# Patient Record
Sex: MALE | Race: WHITE | NOT HISPANIC OR LATINO | Employment: FULL TIME | URBAN - METROPOLITAN AREA
[De-identification: names, ages, dates, MRNs, and addresses within clinical notes are randomized per-mention and may not be internally consistent; named-entity substitution may affect disease eponyms.]

---
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
## 2019-08-05 ENCOUNTER — OFFICE VISIT (OUTPATIENT)
Dept: FAMILY MEDICINE | Facility: CLINIC | Age: 19
End: 2019-08-05
Payer: COMMERCIAL

## 2019-08-05 VITALS — WEIGHT: 127 LBS | DIASTOLIC BLOOD PRESSURE: 78 MMHG | BODY MASS INDEX: 17.71 KG/M2 | SYSTOLIC BLOOD PRESSURE: 128 MMHG

## 2019-08-05 DIAGNOSIS — Z79.899 LONG-TERM CURRENT USE OF ISOTRETINOIN: ICD-10-CM

## 2019-08-05 DIAGNOSIS — L70.0 ACNE VULGARIS: Primary | ICD-10-CM

## 2019-08-05 DIAGNOSIS — Z79.899 ENCOUNTER FOR LONG-TERM (CURRENT) USE OF HIGH-RISK MEDICATION: ICD-10-CM

## 2019-08-05 LAB
BASOPHILS # BLD AUTO: 0 10E9/L (ref 0–0.2)
BASOPHILS NFR BLD AUTO: 0.3 %
DIFFERENTIAL METHOD BLD: NORMAL
EOSINOPHIL # BLD AUTO: 0.1 10E9/L (ref 0–0.7)
EOSINOPHIL NFR BLD AUTO: 1.6 %
ERYTHROCYTE [DISTWIDTH] IN BLOOD BY AUTOMATED COUNT: 13.5 % (ref 10–15)
HCT VFR BLD AUTO: 46.1 % (ref 40–53)
HGB BLD-MCNC: 16.2 G/DL (ref 13.3–17.7)
LYMPHOCYTES # BLD AUTO: 1.9 10E9/L (ref 0.8–5.3)
LYMPHOCYTES NFR BLD AUTO: 29.8 %
MCH RBC QN AUTO: 28.4 PG (ref 26.5–33)
MCHC RBC AUTO-ENTMCNC: 35.1 G/DL (ref 31.5–36.5)
MCV RBC AUTO: 81 FL (ref 78–100)
MONOCYTES # BLD AUTO: 0.5 10E9/L (ref 0–1.3)
MONOCYTES NFR BLD AUTO: 8.6 %
NEUTROPHILS # BLD AUTO: 3.8 10E9/L (ref 1.6–8.3)
NEUTROPHILS NFR BLD AUTO: 59.7 %
PLATELET # BLD AUTO: 186 10E9/L (ref 150–450)
RBC # BLD AUTO: 5.7 10E12/L (ref 4.4–5.9)
WBC # BLD AUTO: 6.3 10E9/L (ref 4–11)

## 2019-08-05 PROCEDURE — 85025 COMPLETE CBC W/AUTO DIFF WBC: CPT | Performed by: FAMILY MEDICINE

## 2019-08-05 PROCEDURE — 99213 OFFICE O/P EST LOW 20 MIN: CPT | Performed by: FAMILY MEDICINE

## 2019-08-05 PROCEDURE — 80061 LIPID PANEL: CPT | Performed by: FAMILY MEDICINE

## 2019-08-05 PROCEDURE — 36415 COLL VENOUS BLD VENIPUNCTURE: CPT | Performed by: FAMILY MEDICINE

## 2019-08-05 PROCEDURE — 84450 TRANSFERASE (AST) (SGOT): CPT | Performed by: FAMILY MEDICINE

## 2019-08-05 RX ORDER — ISOTRETINOIN 20 MG/1
20 CAPSULE ORAL 2 TIMES DAILY
Qty: 60 CAPSULE | Refills: 0 | Status: SHIPPED | OUTPATIENT
Start: 2019-08-05 | End: 2019-10-03

## 2019-08-05 NOTE — PATIENT INSTRUCTIONS
"FUTURE APPOINTMENTS  Please get labs done today.  Follow up in 28-31 days.    ORAL ISOTRETINOIN INSTRUCTIONS  CURRENT DOSAGE: Take by mouth one 20 mg tablet, two times a day.      Stop all other acne products, except you may use only Cetaphil or CeraVe facial cleanser.    Take the isotretinoin with a fatty meal (such as peanut butter or yogurt) to improve the absorption of the medication.    OFFICE VISIT INSTRUCTIONS    Schedule follow-up appointments every 28-31 days.    Bring iPledge ID# and PASSWORD with you to each office visit. Make sure you have obtained your password before the next office visit.    IF YOU HAVE NOT RECEIVED YOUR PASSWORD IN THE MAIL IN 2 WEEKS, CALL IPLEDGE.    You will need to do a lab blood draw every month:    Schedule blood draw to be completed 1-2 days prior to each office visit  You may complete these at any [facility] lab; just remember to schedule it before you go.      If you are being seen elsewhere by a dermatologist for oral isotretinoin monitoring, be sure to go into iPledge for \"transfer of care\" for the appropriate physician.    Make sure to always  your medication within 3 days of prescription being sent to the pharmacy    Check with your insurance company for coverage of oral isotretinoin therapy for recalcitrant acne. Ask if they have a preferred brand of oral isotretinoin (e.g. Claravis, Myorisan, Zenatane, Amnesteem, Sotret)    RECOMMENDATIONS FOR DRYNESS    Moisturizer : Cetaphil facial moisturizer.    Nasal mist spray : Ocean brand. Use at bedtime.    Do not use sommer-synephrine.    Lips : Aquaphor ointment, Vaseline jelly, or Vanicream lip protectant for the dryness on the lips.    Eye drops : Refresh tears saline eye drops for dry eye symptoms. Consider also use of gel eye drops at bedtime if excessive eye dryness.    Due to dryness of mouth, floss daily and brush teeth at least twice daily.    Consider supplementation of omega 3 oil 1 gram/day.    Make sure " to apply sunscreen regularly.

## 2019-08-05 NOTE — PROGRESS NOTES
"[facility] - PRIMARY CARE SKIN    CC: Acne  SUBJECTIVE:   Misbah Thompson is a(n) 19 year old male who presents to clinic today for follow-up of acne.    Symptoms have been ongoing for: years (since 6th grade).  The acne is primarily located on the: face.  Acne generally presents as: deeper lesions on the face.    Previous treatments include: oral doxycycline, topical clindamycin, topical tretinoin.  He took oral isotretinoin for 1 month in May 2017 at 40 mg every day but insurance . Acne control had improved while taking oral isotretinoin.    Skin type: oily.  Family history of acne: YES - \"everyone\" in family.  Risk factors for acne: none identified.  Does the patient take a protein supplement?: No.      PHQ-2 Score:   PHQ-2 (  [employer]) 2019   Q1: Little interest or pleasure in doing things 0 0   Q2: Feeling down, depressed or hopeless 0 0   PHQ-2 Score 0 0     iPLEDGE #: [id number]    Refer to electronic medical record (EMR) for past medical history and medications.    INTEGUMENTARY/SKIN: POSITIVE for acne  ROS: 14 point review of systems was negative except the symptoms listed above in the HPI.    This document serves as a record of the services and decisions personally performed and made by Blanca Anand MD and was created by Gautam Harris, a trained medical scribe, based on personal observations and provider statements to the medical scribe.  2019 10:06 AM   Gautam Harris    OBJECTIVE:     Wt Readings from Last 2 Encounters:   19 127 lb (57.6 kg)   17 132 lb 9.6 oz (60.1 kg) (17 %)*     * Growth percentiles are based on CDC (Boys, 2-20 Years) data.     GENERAL: healthy, alert and no distress.  SKIN: Lebron Skin Type - II.  Face, Neck and Trunk examined. The dermatoscope was used to help evaluate pigmented lesions.  Skin Pertinent Findings:  Face: Moderate-severe scarring. Scattered inflammatory papules and nodules in various stages of resolution. Some " "involvement on the upper neck.    Chest: Some inflammatory papules.    Shoulders: some inflammatory papules and some moderate scarring    Back: Clear.    Diagnostic Test Results:  Monitoring CBC, Lipid Profile, AST, (hCG if female).    ASSESSMENT:     Encounter Diagnoses   Name Primary?     Acne vulgaris Yes     Long-term current use of isotretinoin      Encounter for long-term (current) use of high-risk medication      Comment: severe acne, oral isotretinoin treatment with monthly monitoring.  MDM: Side effects of oral isotretinoin reviewed - dryness of the skin and mucous membranes, arthralgias, myalgias, mood changes. Discussed potential flare of the acne.    PLAN:     Patient Instructions   FUTURE APPOINTMENTS  Please get labs done today.  Follow up in 28-31 days.    ORAL ISOTRETINOIN INSTRUCTIONS  CURRENT DOSAGE: Take by mouth one 20 mg tablet, two times a day.      Stop all other acne products, except you may use only Cetaphil or CeraVe facial cleanser.    Take the isotretinoin with a fatty meal (such as peanut butter or yogurt) to improve the absorption of the medication.    OFFICE VISIT INSTRUCTIONS    Schedule follow-up appointments every 28-31 days.    Bring iPledge ID# and PASSWORD with you to each office visit. Make sure you have obtained your password before the next office visit.    IF YOU HAVE NOT RECEIVED YOUR PASSWORD IN THE MAIL IN 2 WEEKS, CALL IPLEDGE.    You will need to do a lab blood draw every month:    Schedule blood draw to be completed 1-2 days prior to each office visit  You may complete these at any [facility] lab; just remember to schedule it before you go.      If you are being seen elsewhere by a dermatologist for oral isotretinoin monitoring, be sure to go into iPledge for \"transfer of care\" for the appropriate physician.    Make sure to always  your medication within 3 days of prescription being sent to the pharmacy    Check with your insurance company for coverage of " oral isotretinoin therapy for recalcitrant acne. Ask if they have a preferred brand of oral isotretinoin (e.g. Claravis, Myorisan, Zenatane, Amnesteem, Sotret)    RECOMMENDATIONS FOR DRYNESS    Moisturizer : Cetaphil facial moisturizer.    Nasal mist spray : Ocean brand. Use at bedtime.    Do not use sommer-synephrine.    Lips : Aquaphor ointment, Vaseline jelly, or Vanicream lip protectant for the dryness on the lips.    Eye drops : Refresh tears saline eye drops for dry eye symptoms. Consider also use of gel eye drops at bedtime if excessive eye dryness.    Due to dryness of mouth, floss daily and brush teeth at least twice daily.    Consider supplementation of omega 3 oil 1 gram/day.    Make sure to apply sunscreen regularly.        When on oral isotretinoin, discontinue all other acne medications. Discussed the importance of sticking with the oral isotretinoin therapy program, not picking or excoriating.    Oral isotretinoin discussed fully with the patient .  Oral isotretinoin is a very effective drug to treat acne vulgaris but has many significant side effects. Chief among these are teratogenesis, hepatic injury, dyslipidemia and severe drying of the mucous membranes. All of these issues have been discussed in detail. Monthly blood tests to monitor lipids and liver functions will be necessary. Expect painful dryness and/or fissuring around the lips, eyes, and other moist areas of the body. Balms may be protective. Contact lenses may be too painful to wear temporarily while on this drug. Episodes of significant depression have been reported, including suicidal ideation and attempts in rare cases. It may also cause pseudotumor cerebri and hyperostosis. The patient will report any such changes in mood, depressive symptoms or suicidal thoughts, headaches, joint or bone pains.    Female patients MUST use two simultaneous methods of family planning. Accutane is Category X for pregnancy, meaning it will cause fetal  teratogenic malformations, and pregnancy MUST be avoided while on this drug. For that reason, the patient is admonished to never share the medication.    The dose is 0.5-1 mg/kg in two divided doses for 15-20 weeks.    After discussion of these important issues, he indicates complete understanding of all of the above, and Does wish to proceed with accutane therapy.    Literature provided: iPLEDGE Program materials. The patient was thoroughly counseled about side effects, benefits, and risks of isotretinoin. he was registered on the iPLEDGE Program website. Initial lab studies were ordered. A signed consent was obtained.    Goal dosage: 8640 mg = 57.6 kg (actual weight) * 150 mg/kg.    Dose:  Month #: 1.  Oral isotretinoin dosage: 20 mg po BID.  Insurance preferred brand: Patient to check on insurance status.    Previous oral isotretinoin dosing:  N/A    Total dose to date of current course: 0 mg.    RTC in one month for follow up, or sooner prn, with laboratory studies completed.    TT: 20 minutes.  CT: 15 minutes.    The information in this document, created by the medical scribe for me, accurately reflects the services I personally performed and the decisions made by me. I have reviewed and approved this document for accuracy prior to leaving the patient care area.  August 5, 2019 10:35 AM  Blanca Anand MD  [facility]

## 2019-08-06 ENCOUNTER — TELEPHONE (OUTPATIENT)
Dept: FAMILY MEDICINE | Facility: CLINIC | Age: 19
End: 2019-08-06

## 2019-08-06 LAB
AST SERPL W P-5'-P-CCNC: 27 U/L (ref 0–45)
CHOLEST SERPL-MCNC: 155 MG/DL
HDLC SERPL-MCNC: 40 MG/DL
LDLC SERPL CALC-MCNC: 99 MG/DL
NONHDLC SERPL-MCNC: 115 MG/DL
TRIGL SERPL-MCNC: 81 MG/DL

## 2019-08-06 NOTE — TELEPHONE ENCOUNTER
Patient states at the pharmacy and was not able to  Accutane rx because the doctor has signed the I Pledge. Please call the patient as soon as signed. Shari Kaye RN

## 2019-08-06 NOTE — TELEPHONE ENCOUNTER
Spoke with the pharmacy and they now have what they need. They will contact the patient. Shari Kaye RN

## 2019-09-05 ENCOUNTER — OFFICE VISIT (OUTPATIENT)
Dept: FAMILY MEDICINE | Facility: CLINIC | Age: 19
End: 2019-09-05
Payer: COMMERCIAL

## 2019-09-05 VITALS — SYSTOLIC BLOOD PRESSURE: 124 MMHG | BODY MASS INDEX: 17.71 KG/M2 | DIASTOLIC BLOOD PRESSURE: 76 MMHG | WEIGHT: 127 LBS

## 2019-09-05 DIAGNOSIS — L70.0 ACNE VULGARIS: Primary | ICD-10-CM

## 2019-09-05 DIAGNOSIS — Z79.899 LONG-TERM CURRENT USE OF ISOTRETINOIN: ICD-10-CM

## 2019-09-05 DIAGNOSIS — Z79.899 ENCOUNTER FOR LONG-TERM (CURRENT) USE OF HIGH-RISK MEDICATION: ICD-10-CM

## 2019-09-05 LAB
BASOPHILS # BLD AUTO: 0 10E9/L (ref 0–0.2)
BASOPHILS NFR BLD AUTO: 0.2 %
DIFFERENTIAL METHOD BLD: NORMAL
EOSINOPHIL # BLD AUTO: 0.1 10E9/L (ref 0–0.7)
EOSINOPHIL NFR BLD AUTO: 1.4 %
ERYTHROCYTE [DISTWIDTH] IN BLOOD BY AUTOMATED COUNT: 13.3 % (ref 10–15)
HCT VFR BLD AUTO: 45.7 % (ref 40–53)
HGB BLD-MCNC: 15.8 G/DL (ref 13.3–17.7)
LYMPHOCYTES # BLD AUTO: 1.5 10E9/L (ref 0.8–5.3)
LYMPHOCYTES NFR BLD AUTO: 30.5 %
MCH RBC QN AUTO: 28.4 PG (ref 26.5–33)
MCHC RBC AUTO-ENTMCNC: 34.6 G/DL (ref 31.5–36.5)
MCV RBC AUTO: 82 FL (ref 78–100)
MONOCYTES # BLD AUTO: 0.4 10E9/L (ref 0–1.3)
MONOCYTES NFR BLD AUTO: 7.6 %
NEUTROPHILS # BLD AUTO: 3 10E9/L (ref 1.6–8.3)
NEUTROPHILS NFR BLD AUTO: 60.3 %
PLATELET # BLD AUTO: 182 10E9/L (ref 150–450)
RBC # BLD AUTO: 5.56 10E12/L (ref 4.4–5.9)
WBC # BLD AUTO: 4.9 10E9/L (ref 4–11)

## 2019-09-05 PROCEDURE — 36415 COLL VENOUS BLD VENIPUNCTURE: CPT | Performed by: FAMILY MEDICINE

## 2019-09-05 PROCEDURE — 84450 TRANSFERASE (AST) (SGOT): CPT | Performed by: FAMILY MEDICINE

## 2019-09-05 PROCEDURE — 99213 OFFICE O/P EST LOW 20 MIN: CPT | Performed by: FAMILY MEDICINE

## 2019-09-05 PROCEDURE — 80061 LIPID PANEL: CPT | Performed by: FAMILY MEDICINE

## 2019-09-05 PROCEDURE — 85025 COMPLETE CBC W/AUTO DIFF WBC: CPT | Performed by: FAMILY MEDICINE

## 2019-09-05 RX ORDER — ISOTRETINOIN 30 MG/1
CAPSULE ORAL
Qty: 60 CAPSULE | Refills: 0 | Status: SHIPPED | OUTPATIENT
Start: 2019-09-05 | End: 2019-11-04 | Stop reason: ALTCHOICE

## 2019-09-05 NOTE — PROGRESS NOTES
[facility] - PRIMARY CARE SKIN    CC: Acne  SUBJECTIVE:   Misbah Thompson is a(n) 19 year old male who presents to clinic today for follow-up of treatment of his acne with oral isotretinoin .  Previous treatments include: oral doxycycline, topical clindamycin, topical tretinoin.  He took oral isotretinoin for 1 month in May 2017 at 40 mg every day but insurance . Acne control had improved while taking oral isotretinoin.    Months completed : 1  Current dosage : 20 mg bid.  Treatment response : arms are dry  Side effects noted : Dryness.    [employer] MachineEDGE #: [id number]    Refer to electronic medical record (EMR) for past medical history and medications.    INTEGUMENTARY/SKIN: POSITIVE for acne  ROS: 14 point review of systems was negative except the symptoms listed above in the HPI.        OBJECTIVE:     Wt Readings from Last 2 Encounters:   19 127 lb (57.6 kg)   17 132 lb 9.6 oz (60.1 kg) (17 %)*     * Growth percentiles are based on [facility] (Boys, 2-20 Years) data.     GENERAL: healthy, alert and no distress.  SKIN: Lebron Skin Type - II.  Face, Neck and Trunk examined. The dermatoscope was used to help evaluate pigmented lesions.  Skin Pertinent Findings:                          Face: Moderate-severe scarring. Inflammatory papules   Chest: Some inflammatory papules.    Shoulders: some inflammatory papules and some moderate scarring    Back: Clear.    Diagnostic Test Results:  Monitoring CBC, Lipid Profile, AST, (hCG if female).    ASSESSMENT:     Encounter Diagnoses   Name Primary?     Acne vulgaris Yes     Long-term current use of isotretinoin      Encounter for long-term (current) use of high-risk medication      Comment: severe acne, oral isotretinoin treatment with monthly monitoring.  MDM: Side effects of oral isotretinoin reviewed - dryness of the skin and mucous membranes, arthralgias, myalgias, mood changes. Discussed potential flare of the acne.    PLAN:     Patient Instructions  "  FUTURE APPOINTMENTS  Follow up in 28-31 days.    ORAL ISOTRETINOIN INSTRUCTIONS  CURRENT DOSAGE: Take by mouth one 30 mg tablet twice per day      Stop all other acne products, except you may use only Cetaphil or CeraVe facial cleanser.    Take the isotretinoin with a fatty meal (such as peanut butter or yogurt) to improve the absorption of the medication.    OFFICE VISIT INSTRUCTIONS    Schedule follow-up appointments every 28-31 days.    Bring iPledge ID# and PASSWORD with you to each office visit. Make sure you have obtained your password before the next office visit.    IF YOU HAVE NOT RECEIVED YOUR PASSWORD IN THE MAIL IN 2 WEEKS, CALL IPLEDGE.    You will need to do a lab blood draw every month:    Schedule blood draw to be completed 1-2 days prior to each office visit  You may complete these at any [facility] lab; just remember to schedule it before you go.      If you are being seen elsewhere by a dermatologist for oral isotretinoin monitoring, be sure to go into iPledge for \"transfer of care\" for the appropriate physician.    Make sure to always  your medication within 3 days of prescription being sent to the pharmacy    Check with your insurance company for coverage of oral isotretinoin therapy for recalcitrant acne. Ask if they have a preferred brand of oral isotretinoin (e.g. Claravis, Myorisan, Zenatane, Amnesteem, Sotret)    RECOMMENDATIONS FOR DRYNESS    Moisturizer : Cetaphil facial moisturizer.    Nasal mist spray : Ocean brand. Use at bedtime.    Do not use sommer-synephrine.    Lips : Aquaphor ointment, Vaseline jelly, or Vanicream lip protectant for the dryness on the lips.    Eye drops : Refresh tears saline eye drops for dry eye symptoms. Consider also use of gel eye drops at bedtime if excessive eye dryness.    Due to dryness of mouth, floss daily and brush teeth at least twice daily.    Consider supplementation of omega 3 oil 1 gram/day.    Make sure to apply sunscreen " regularly.          The dose is 0.5-1 mg/kg in two divided doses for 15-20 weeks.    After discussion of these important issues, he indicates complete understanding of all of the above, and Does wish to proceed with accutane therapy.    Literature provided: iPLEDGE Program materials. The patient was thoroughly counseled about side effects, benefits, and risks of isotretinoin. he was registered on the iPLEDGE Program website. Initial lab studies were ordered. A signed consent was obtained.    Goal dosage: 8640 mg = 57.6 kg (actual weight) * 150 mg/kg.    Dose:  Month #: 2  Oral isotretinoin dosage: 230 mg po BID.  Insurance preferred brand: Patient to check on insurance status.    Previous oral isotretinoin dosing:  N/A    Total dose to date of current course: 1200    RTC in one month for follow up, or sooner prn, with laboratory studies completed.    TT: 20 minutes.  CT: 15 minutes.

## 2019-09-05 NOTE — PATIENT INSTRUCTIONS
"FUTURE APPOINTMENTS  Follow up in 28-31 days.    ORAL ISOTRETINOIN INSTRUCTIONS  CURRENT DOSAGE: Take by mouth one 30 mg tablet twice per day      Stop all other acne products, except you may use only Cetaphil or CeraVe facial cleanser.    Take the isotretinoin with a fatty meal (such as peanut butter or yogurt) to improve the absorption of the medication.    OFFICE VISIT INSTRUCTIONS    Schedule follow-up appointments every 28-31 days.    Bring iPledge ID# and PASSWORD with you to each office visit. Make sure you have obtained your password before the next office visit.    IF YOU HAVE NOT RECEIVED YOUR PASSWORD IN THE MAIL IN 2 WEEKS, CALL IPLEDGE.    You will need to do a lab blood draw every month:    Schedule blood draw to be completed 1-2 days prior to each office visit  You may complete these at any [facility] lab; just remember to schedule it before you go.      If you are being seen elsewhere by a dermatologist for oral isotretinoin monitoring, be sure to go into iPledge for \"transfer of care\" for the appropriate physician.    Make sure to always  your medication within 3 days of prescription being sent to the pharmacy    Check with your insurance company for coverage of oral isotretinoin therapy for recalcitrant acne. Ask if they have a preferred brand of oral isotretinoin (e.g. Claravis, Myorisan, Zenatane, Amnesteem, Sotret)    RECOMMENDATIONS FOR DRYNESS    Moisturizer : Cetaphil facial moisturizer.    Nasal mist spray : Ocean brand. Use at bedtime.    Do not use sommer-synephrine.    Lips : Aquaphor ointment, Vaseline jelly, or Vanicream lip protectant for the dryness on the lips.    Eye drops : Refresh tears saline eye drops for dry eye symptoms. Consider also use of gel eye drops at bedtime if excessive eye dryness.    Due to dryness of mouth, floss daily and brush teeth at least twice daily.    Consider supplementation of omega 3 oil 1 gram/day.    Make sure to apply sunscreen " regularly.

## 2019-09-06 LAB
AST SERPL W P-5'-P-CCNC: 32 U/L (ref 0–45)
CHOLEST SERPL-MCNC: 161 MG/DL
HDLC SERPL-MCNC: 34 MG/DL
LDLC SERPL CALC-MCNC: 103 MG/DL
NONHDLC SERPL-MCNC: 127 MG/DL
TRIGL SERPL-MCNC: 120 MG/DL

## 2019-10-03 ENCOUNTER — OFFICE VISIT (OUTPATIENT)
Dept: FAMILY MEDICINE | Facility: CLINIC | Age: 19
End: 2019-10-03
Payer: COMMERCIAL

## 2019-10-03 VITALS — SYSTOLIC BLOOD PRESSURE: 122 MMHG | DIASTOLIC BLOOD PRESSURE: 74 MMHG

## 2019-10-03 DIAGNOSIS — L70.0 ACNE VULGARIS: Primary | ICD-10-CM

## 2019-10-03 PROCEDURE — 99213 OFFICE O/P EST LOW 20 MIN: CPT | Performed by: FAMILY MEDICINE

## 2019-10-03 RX ORDER — ISOTRETINOIN 40 MG/1
40 CAPSULE ORAL 2 TIMES DAILY
Qty: 60 CAPSULE | Refills: 0 | Status: SHIPPED | OUTPATIENT
Start: 2019-10-03 | End: 2019-11-04

## 2019-10-03 NOTE — PATIENT INSTRUCTIONS
"FUTURE APPOINTMENTS  Follow up in 28-31 days.    ORAL ISOTRETINOIN INSTRUCTIONS  CURRENT DOSAGE: Take by mouth one 40 mg tablet, 2 times a day.      Stop all other acne products, except you may use only Cetaphil or CeraVe facial cleanser.    Take the isotretinoin with a fatty meal (such as peanut butter or yogurt) to improve the absorption of the medication.    OFFICE VISIT INSTRUCTIONS    Schedule follow-up appointments every 28-31 days.    Bring iPledge ID# and PASSWORD with you to each office visit. Make sure you have obtained your password before the next office visit.    IF YOU HAVE NOT RECEIVED YOUR PASSWORD IN THE MAIL IN 2 WEEKS, CALL IPLEDGE.    You will need to do a lab blood draw every month:    Schedule blood draw to be completed 1-2 days prior to each office visit  You may complete these at any [facility] lab; just remember to schedule it before you go.      If you are being seen elsewhere by a dermatologist for oral isotretinoin monitoring, be sure to go into iPledge for \"transfer of care\" for the appropriate physician.    Make sure to always  your medication within 3 days of prescription being sent to the pharmacy    Check with your insurance company for coverage of oral isotretinoin therapy for recalcitrant acne. Ask if they have a preferred brand of oral isotretinoin (e.g. Claravis, Myorisan, Zenatane, Amnesteem, Sotret)    RECOMMENDATIONS FOR DRYNESS    Moisturizer : Cetaphil facial moisturizer.    Nasal mist spray : Ocean brand. Use at bedtime.    Do not use sommer-synephrine.    Lips : Aquaphor ointment, Vaseline jelly, or Vanicream lip protectant for the dryness on the lips.    Eye drops : Refresh tears saline eye drops for dry eye symptoms. Consider also use of gel eye drops at bedtime if excessive eye dryness.    Due to dryness of mouth, floss daily and brush teeth at least twice daily.    Consider supplementation of omega 3 oil 1 gram/day.    Make sure to apply sunscreen " regularly.

## 2019-11-04 ENCOUNTER — OFFICE VISIT (OUTPATIENT)
Dept: FAMILY MEDICINE | Facility: CLINIC | Age: 19
End: 2019-11-04
Payer: COMMERCIAL

## 2019-11-04 VITALS — SYSTOLIC BLOOD PRESSURE: 122 MMHG | DIASTOLIC BLOOD PRESSURE: 70 MMHG

## 2019-11-04 DIAGNOSIS — Z79.899 ENCOUNTER FOR LONG-TERM (CURRENT) USE OF HIGH-RISK MEDICATION: ICD-10-CM

## 2019-11-04 DIAGNOSIS — Z79.899 LONG-TERM CURRENT USE OF ISOTRETINOIN: ICD-10-CM

## 2019-11-04 DIAGNOSIS — L70.0 ACNE VULGARIS: ICD-10-CM

## 2019-11-04 LAB
BASOPHILS # BLD AUTO: 0 10E9/L (ref 0–0.2)
BASOPHILS NFR BLD AUTO: 0.4 %
DIFFERENTIAL METHOD BLD: NORMAL
EOSINOPHIL # BLD AUTO: 0.1 10E9/L (ref 0–0.7)
EOSINOPHIL NFR BLD AUTO: 2.5 %
ERYTHROCYTE [DISTWIDTH] IN BLOOD BY AUTOMATED COUNT: 13.4 % (ref 10–15)
HCT VFR BLD AUTO: 42.8 % (ref 40–53)
HGB BLD-MCNC: 14.8 G/DL (ref 13.3–17.7)
LYMPHOCYTES # BLD AUTO: 1.9 10E9/L (ref 0.8–5.3)
LYMPHOCYTES NFR BLD AUTO: 37.4 %
MCH RBC QN AUTO: 28.5 PG (ref 26.5–33)
MCHC RBC AUTO-ENTMCNC: 34.6 G/DL (ref 31.5–36.5)
MCV RBC AUTO: 83 FL (ref 78–100)
MONOCYTES # BLD AUTO: 0.4 10E9/L (ref 0–1.3)
MONOCYTES NFR BLD AUTO: 7.6 %
NEUTROPHILS # BLD AUTO: 2.7 10E9/L (ref 1.6–8.3)
NEUTROPHILS NFR BLD AUTO: 52.1 %
PLATELET # BLD AUTO: 205 10E9/L (ref 150–450)
RBC # BLD AUTO: 5.19 10E12/L (ref 4.4–5.9)
WBC # BLD AUTO: 5.1 10E9/L (ref 4–11)

## 2019-11-04 PROCEDURE — 80061 LIPID PANEL: CPT | Performed by: FAMILY MEDICINE

## 2019-11-04 PROCEDURE — 99213 OFFICE O/P EST LOW 20 MIN: CPT | Performed by: FAMILY MEDICINE

## 2019-11-04 PROCEDURE — 85025 COMPLETE CBC W/AUTO DIFF WBC: CPT | Performed by: FAMILY MEDICINE

## 2019-11-04 PROCEDURE — 84450 TRANSFERASE (AST) (SGOT): CPT | Performed by: FAMILY MEDICINE

## 2019-11-04 PROCEDURE — 36415 COLL VENOUS BLD VENIPUNCTURE: CPT | Performed by: FAMILY MEDICINE

## 2019-11-04 RX ORDER — ISOTRETINOIN 40 MG/1
40 CAPSULE ORAL 2 TIMES DAILY
Qty: 60 CAPSULE | Refills: 0 | Status: SHIPPED | OUTPATIENT
Start: 2019-11-04 | End: 2019-12-02

## 2019-11-04 NOTE — PATIENT INSTRUCTIONS
"FUTURE APPOINTMENTS  Follow up in 28-31 days.    ORAL ISOTRETINOIN INSTRUCTIONS  CURRENT DOSAGE: Take by mouth one 40 mg tablet, two times a day.      Stop all other acne products, except you may use only Cetaphil or CeraVe facial cleanser.    Take the isotretinoin with a fatty meal (such as peanut butter or yogurt) to improve the absorption of the medication.    OFFICE VISIT INSTRUCTIONS    Schedule follow-up appointments every 28-31 days.    Bring iPledge ID# and PASSWORD with you to each office visit. Make sure you have obtained your password before the next office visit.    IF YOU HAVE NOT RECEIVED YOUR PASSWORD IN THE MAIL IN 2 WEEKS, CALL IPLEDGE.    You will need to do a lab blood draw every month:    Schedule blood draw to be completed 1-2 days prior to each office visit  You may complete these at any [facility] lab; just remember to schedule it before you go.      If you are being seen elsewhere by a dermatologist for oral isotretinoin monitoring, be sure to go into iPledge for \"transfer of care\" for the appropriate physician.    Make sure to always  your medication within 3 days of prescription being sent to the pharmacy    Check with your insurance company for coverage of oral isotretinoin therapy for recalcitrant acne. Ask if they have a preferred brand of oral isotretinoin (e.g. Claravis, Myorisan, Zenatane, Amnesteem, Sotret)    RECOMMENDATIONS FOR DRYNESS    Moisturizer : Cetaphil facial moisturizer.    Nasal mist spray : Ocean brand. Use at bedtime.    Do not use sommer-synephrine.    Lips : Aquaphor ointment, Vaseline jelly, or Vanicream lip protectant for the dryness on the lips.    Eye drops : Refresh tears saline eye drops for dry eye symptoms. Consider also use of gel eye drops at bedtime if excessive eye dryness.    Due to dryness of mouth, floss daily and brush teeth at least twice daily.    Consider supplementation of omega 3 oil 1 gram/day.    Make sure to apply sunscreen " regularly.

## 2019-11-04 NOTE — PROGRESS NOTES
[facility] - PRIMARY CARE SKIN    CC: recalcitrant acne, oral isotretinoin therapy  SUBJECTIVE:   Misbah Thompson is a(n) 19 year old male who presents to clinic today for follow-up of severe, recalcitrant acne.    Oral isotretinoin therapy monitoring    Months completed: 3.  Last month dosage: 40 mg BID.    Treatment response over the last month:  Acne breakouts had increased in the last month.    Side effects noted:    Dryness: YES but manageable.    Arthralgias/myalgias: Soreness is a chronic issue, but he has not noticed any changes.    Mood changes: NO.    Other: None.    PHQ-2 Score:   PHQ-2 ( 1999 [employer]) 9/5/2019 8/5/2019   Q1: Little interest or pleasure in doing things 0 0   Q2: Feeling down, depressed or hopeless 0 0   PHQ-2 Score 0 0     iPLEDGE #: [id number]    Refer to electronic medical record (EMR) for past medical history and medications.    INTEGUMENTARY/SKIN: POSITIVE for acne  ROS: 14 point review of systems was negative except the symptoms listed above in the HPI.    This document serves as a record of the services and decisions personally performed and made by Blanca Anand MD and was created by Gautam Harris, a trained medical scribe, based on personal observations and provider statements to the medical scribe.  November 4, 2019 12:09 PM   Gautam Harris    OBJECTIVE:     Wt Readings from Last 2 Encounters:   09/05/19 127 lb (57.6 kg)   08/05/19 127 lb (57.6 kg)     GENERAL: healthy, alert and no distress.  SKIN: Lebron Skin Type - II.  Face, Neck and Trunk examined. The dermatoscope was used to help evaluate pigmented lesions.  Skin Pertinent Findings:  Face: Scattered infrequent inflammatory papules  Chest: Few scattered inflammatory papules    Diagnostic Test Results:  Monitoring CBC, Lipid Profile, AST, (hCG if female).    ASSESSMENT:     Encounter Diagnoses   Name Primary?     Acne vulgaris      Long-term current use of isotretinoin      Encounter for long-term (current) use  "of high-risk medication      Comment: severe acne, oral isotretinoin treatment with monthly monitoring.  MDM: Side effects of oral isotretinoin reviewed - dryness of the skin and mucous membranes, arthralgias, myalgias, mood changes. Discussed potential flare of the acne.    PLAN:   Patient Instructions   FUTURE APPOINTMENTS  Follow up in 28-31 days.    ORAL ISOTRETINOIN INSTRUCTIONS  CURRENT DOSAGE: Take by mouth one 40 mg tablet, two times a day.      Stop all other acne products, except you may use only Cetaphil or CeraVe facial cleanser.    Take the isotretinoin with a fatty meal (such as peanut butter or yogurt) to improve the absorption of the medication.    OFFICE VISIT INSTRUCTIONS    Schedule follow-up appointments every 28-31 days.    Bring iPledge ID# and PASSWORD with you to each office visit. Make sure you have obtained your password before the next office visit.    IF YOU HAVE NOT RECEIVED YOUR PASSWORD IN THE MAIL IN 2 WEEKS, CALL IPLEDGE.    You will need to do a lab blood draw every month:    Schedule blood draw to be completed 1-2 days prior to each office visit  You may complete these at any [facility] lab; just remember to schedule it before you go.      If you are being seen elsewhere by a dermatologist for oral isotretinoin monitoring, be sure to go into iPledge for \"transfer of care\" for the appropriate physician.    Make sure to always  your medication within 3 days of prescription being sent to the pharmacy    Check with your insurance company for coverage of oral isotretinoin therapy for recalcitrant acne. Ask if they have a preferred brand of oral isotretinoin (e.g. Claravis, Myorisan, Zenatane, Amnesteem, Sotret)    RECOMMENDATIONS FOR DRYNESS    Moisturizer : Cetaphil facial moisturizer.    Nasal mist spray : Ocean brand. Use at bedtime.    Do not use sommer-synephrine.    Lips : Aquaphor ointment, Vaseline jelly, or Vanicream lip protectant for the dryness on the lips.    Eye " drops : Refresh tears saline eye drops for dry eye symptoms. Consider also use of gel eye drops at bedtime if excessive eye dryness.    Due to dryness of mouth, floss daily and brush teeth at least twice daily.    Consider supplementation of omega 3 oil 1 gram/day.    Make sure to apply sunscreen regularly.        When on oral isotretinoin, discontinue all other acne medications. Discussed the importance of sticking with the oral isotretinoin therapy program, not picking or excoriating.    Oral isotretinoin discussed fully with the patient.  Oral isotretinoin is a very effective drug to treat acne vulgaris but has many significant side effects. Chief among these are teratogenesis, hepatic injury, dyslipidemia and severe drying of the mucous membranes. All of these issues have been discussed in detail. Monthly blood tests to monitor lipids and liver functions will be necessary. Expect painful dryness and/or fissuring around the lips, eyes, and other moist areas of the body. Balms may be protective. Contact lenses may be too painful to wear temporarily while on this drug. Episodes of significant depression have been reported, including suicidal ideation and attempts in rare cases. It may also cause pseudotumor cerebri and hyperostosis. The patient will report any such changes in mood, depressive symptoms or suicidal thoughts, headaches, joint or bone pains.    Female patients MUST use two simultaneous methods of family planning. Accutane is Category X for pregnancy, meaning it will cause fetal teratogenic malformations, and pregnancy MUST be avoided while on this drug. For that reason, the patient is admonished to never share the medication.    The dose is 0.5-1 mg/kg in two divided doses for 15-20 weeks.    After discussion of these important issues, he indicates complete understanding of all of the above, and Does wish to proceed with accutane therapy.      Goal dosage: 8640 mg = 57.6 kg (actual weight) * 150  mg/kg.    Dose:  Month #: 4.  Oral isotretinoin dosage: 40 mg po BID.  Plan for next month: Maintain dosage.    Previous oral isotretinoin dosing:  Month #1 (prescribed on 8/5/2019): 20 mg po BID * 30 days = 1200 mg.  Month #2 (prescribed on 9/5/2019): 30 mg po BID * 30 days = 1800 mg.  Month #3 (prescribed on 10/3/2019): 40 mg po BID * 30 days = 2400 mg.    Total dose to date: 5400 mg.    RTC in one month for follow up, or sooner prn, with laboratory studies completed.    TT: 20 minutes.  CT: 15 minutes.    The information in this document, created by the medical scribe for me, accurately reflects the services I personally performed and the decisions made by me. I have reviewed and approved this document for accuracy prior to leaving the patient care area.  November 4, 2019 12:09 PM  Blanca Anand MD  [facility] [place]

## 2019-11-05 LAB
AST SERPL W P-5'-P-CCNC: 24 U/L (ref 0–45)
CHOLEST SERPL-MCNC: 172 MG/DL
HDLC SERPL-MCNC: 32 MG/DL
LDLC SERPL CALC-MCNC: 118 MG/DL
NONHDLC SERPL-MCNC: 140 MG/DL
TRIGL SERPL-MCNC: 109 MG/DL

## 2019-12-02 ENCOUNTER — OFFICE VISIT (OUTPATIENT)
Dept: FAMILY MEDICINE | Facility: CLINIC | Age: 19
End: 2019-12-02
Payer: COMMERCIAL

## 2019-12-02 VITALS — SYSTOLIC BLOOD PRESSURE: 122 MMHG | DIASTOLIC BLOOD PRESSURE: 60 MMHG

## 2019-12-02 DIAGNOSIS — L70.0 ACNE VULGARIS: ICD-10-CM

## 2019-12-02 PROCEDURE — 99213 OFFICE O/P EST LOW 20 MIN: CPT | Performed by: FAMILY MEDICINE

## 2019-12-02 RX ORDER — ISOTRETINOIN 40 MG/1
40 CAPSULE ORAL 2 TIMES DAILY
Qty: 60 CAPSULE | Refills: 0 | Status: SHIPPED | OUTPATIENT
Start: 2019-12-02 | End: 2020-03-30 | Stop reason: DRUGHIGH

## 2019-12-02 NOTE — PROGRESS NOTES
[facility] - PRIMARY CARE SKIN    CC: recalcitrant acne, oral isotretinoin therapy  SUBJECTIVE:   Misbah Thompson is a(n) 19 year old male who presents to clinic today for follow-up of severe, recalcitrant acne.    Oral isotretinoin therapy monitoring    Months completed: 4  Last month dosage: 40 mg BID.    Treatment response over the last month: few breakout on the face and chest.        Side effects noted:    Dryness: YES but manageable.    Arthralgias/myalgias: Soreness is a chronic issue, but he has not noticed any changes.    Mood changes: NO.    Other: None.    PHQ-2 Score:   PHQ-2 ( 1999 Pfizer) 9/5/2019 8/5/2019   Q1: Little interest or pleasure in doing things 0 0   Q2: Feeling down, depressed or hopeless 0 0   PHQ-2 Score 0 0     iPLEDGE #: [id number]    Refer to electronic medical record (EMR) for past medical history and medications.    INTEGUMENTARY/SKIN: POSITIVE for acne  ROS: 14 point review of systems was negative except the symptoms listed above in the HPI.      OBJECTIVE:     Wt Readings from Last 2 Encounters:   09/05/19 127 lb (57.6 kg)   08/05/19 127 lb (57.6 kg)     GENERAL: healthy, alert and no distress.  SKIN: Lebron Skin Type - II.  Face, Neck and Trunk examined. The dermatoscope was used to help evaluate pigmented lesions.  Skin Pertinent Findings:  Face: infrequent papules  Chest: Few scattered inflammatory papules    Diagnostic Test Results:  Monitoring CBC, Lipid Profile, AST, (hCG if female).    ASSESSMENT:     Encounter Diagnosis   Name Primary?     Acne vulgaris      Comment: severe acne, oral isotretinoin treatment with monthly monitoring.  MDM: Side effects of oral isotretinoin reviewed - dryness of the skin and mucous membranes, arthralgias, myalgias, mood changes. Discussed potential flare of the acne.    PLAN:   Patient Instructions   Recheck in one month      When on oral isotretinoin, discontinue all other acne medications. Discussed the importance of sticking  with the oral isotretinoin therapy program, not picking or excoriating.    Oral isotretinoin discussed fully with the patient.  Oral isotretinoin is a very effective drug to treat acne vulgaris but has many significant side effects. Chief among these are teratogenesis, hepatic injury, dyslipidemia and severe drying of the mucous membranes. All of these issues have been discussed in detail. Monthly blood tests to monitor lipids and liver functions will be necessary. Expect painful dryness and/or fissuring around the lips, eyes, and other moist areas of the body. Balms may be protective. Contact lenses may be too painful to wear temporarily while on this drug. Episodes of significant depression have been reported, including suicidal ideation and attempts in rare cases. It may also cause pseudotumor cerebri and hyperostosis. The patient will report any such changes in mood, depressive symptoms or suicidal thoughts, headaches, joint or bone pains.    Female patients MUST use two simultaneous methods of family planning. Accutane is Category X for pregnancy, meaning it will cause fetal teratogenic malformations, and pregnancy MUST be avoided while on this drug. For that reason, the patient is admonished to never share the medication.    The dose is 0.5-1 mg/kg in two divided doses for 15-20 weeks.    After discussion of these important issues, he indicates complete understanding of all of the above, and Does wish to proceed with accutane therapy.      Goal dosage: 8640 mg = 57.6 kg (actual weight) * 150 mg/kg.    Dose:  Month #: 4.  Oral isotretinoin dosage: 40 mg po BID.  Plan for next month: Maintain dosage.    Previous oral isotretinoin dosing:  Month #1 (prescribed on 8/5/2019): 20 mg po BID * 30 days = 1200 mg.  Month #2 (prescribed on 9/5/2019): 30 mg po BID * 30 days = 1800 mg.  Month #3 (prescribed on 10/3/2019): 40 mg po BID * 30 days = 2400 mg.  Month #4 (prescribed on /2019): 40 mg po BID * 30 days = 2400  mg.  Total dose to date: 7800 mg    RTC in one month for follow up, or sooner prn, with laboratory studies completed.    TT: 20 minutes.  CT: 15 minutes.

## 2019-12-02 NOTE — LETTER
12/2/2019         RE: Misbah Thompson  [address] [facility] [id number]        Dear Colleague,    Thank you for referring your patient, Misbah Thompson, to the [facility]. Please see a copy of my visit note below.    [facility] - PRIMARY CARE SKIN    CC: recalcitrant acne, oral isotretinoin therapy  SUBJECTIVE:   Misbah Thompson is a(n) 19 year old male who presents to clinic today for follow-up of severe, recalcitrant acne.    Oral isotretinoin therapy monitoring    Months completed: 4  Last month dosage: 40 mg BID.    Treatment response over the last month: few breakout on the face and chest.        Side effects noted:    Dryness: YES but manageable.    Arthralgias/myalgias: Soreness is a chronic issue, but he has not noticed any changes.    Mood changes: NO.    Other: None.    PHQ-2 Score:   PHQ-2 ( 1999 Pfizer) 9/5/2019 8/5/2019   Q1: Little interest or pleasure in doing things 0 0   Q2: Feeling down, depressed or hopeless 0 0   PHQ-2 Score 0 0     iPLEDGE #: [id number]    Refer to electronic medical record (EMR) for past medical history and medications.    INTEGUMENTARY/SKIN: POSITIVE for acne  ROS: 14 point review of systems was negative except the symptoms listed above in the HPI.      OBJECTIVE:     Wt Readings from Last 2 Encounters:   09/05/19 127 lb (57.6 kg)   08/05/19 127 lb (57.6 kg)     GENERAL: healthy, alert and no distress.  SKIN: Lebron Skin Type - II.  Face, Neck and Trunk examined. The dermatoscope was used to help evaluate pigmented lesions.  Skin Pertinent Findings:  Face: infrequent papules  Chest: Few scattered inflammatory papules    Diagnostic Test Results:  Monitoring CBC, Lipid Profile, AST, (hCG if female).    ASSESSMENT:     Encounter Diagnosis   Name Primary?     Acne vulgaris      Comment: severe acne, oral isotretinoin treatment with monthly monitoring.  MDM: Side effects of oral isotretinoin reviewed - dryness of the skin and  mucous membranes, arthralgias, myalgias, mood changes. Discussed potential flare of the acne.    PLAN:   Patient Instructions   Recheck in one month      When on oral isotretinoin, discontinue all other acne medications. Discussed the importance of sticking with the oral isotretinoin therapy program, not picking or excoriating.    Oral isotretinoin discussed fully with the patient.  Oral isotretinoin is a very effective drug to treat acne vulgaris but has many significant side effects. Chief among these are teratogenesis, hepatic injury, dyslipidemia and severe drying of the mucous membranes. All of these issues have been discussed in detail. Monthly blood tests to monitor lipids and liver functions will be necessary. Expect painful dryness and/or fissuring around the lips, eyes, and other moist areas of the body. Balms may be protective. Contact lenses may be too painful to wear temporarily while on this drug. Episodes of significant depression have been reported, including suicidal ideation and attempts in rare cases. It may also cause pseudotumor cerebri and hyperostosis. The patient will report any such changes in mood, depressive symptoms or suicidal thoughts, headaches, joint or bone pains.    Female patients MUST use two simultaneous methods of family planning. Accutane is Category X for pregnancy, meaning it will cause fetal teratogenic malformations, and pregnancy MUST be avoided while on this drug. For that reason, the patient is admonished to never share the medication.    The dose is 0.5-1 mg/kg in two divided doses for 15-20 weeks.    After discussion of these important issues, he indicates complete understanding of all of the above, and Does wish to proceed with accutane therapy.      Goal dosage: 8640 mg = 57.6 kg (actual weight) * 150 mg/kg.    Dose:  Month #: 4.  Oral isotretinoin dosage: 40 mg po BID.  Plan for next month: Maintain dosage.    Previous oral isotretinoin dosing:  Month #1 (prescribed  on 8/5/2019): 20 mg po BID * 30 days = 1200 mg.  Month #2 (prescribed on 9/5/2019): 30 mg po BID * 30 days = 1800 mg.  Month #3 (prescribed on 10/3/2019): 40 mg po BID * 30 days = 2400 mg.  Month #4 (prescribed on /2019): 40 mg po BID * 30 days = 2400 mg.  Total dose to date: 7800 mg    RTC in one month for follow up, or sooner prn, with laboratory studies completed.    TT: 20 minutes.  CT: 15 minutes.        Again, thank you for allowing me to participate in the care of your patient.        Sincerely,        Blanca Anand MD

## 2019-12-06 ENCOUNTER — HOSPITAL ENCOUNTER (EMERGENCY)
Facility: CLINIC | Age: 19
Discharge: HOME | End: 2019-12-06
Attending: EMERGENCY MEDICINE | Admitting: EMERGENCY MEDICINE
Payer: COMMERCIAL

## 2019-12-06 VITALS
DIASTOLIC BLOOD PRESSURE: 72 MMHG | WEIGHT: 130 LBS | OXYGEN SATURATION: 96 % | TEMPERATURE: 98 F | HEIGHT: 71 IN | SYSTOLIC BLOOD PRESSURE: 123 MMHG | RESPIRATION RATE: 18 BRPM | HEART RATE: 74 BPM | BODY MASS INDEX: 18.2 KG/M2

## 2019-12-06 DIAGNOSIS — L60.0 INGROWN TOENAIL OF LEFT FOOT WITH INFECTION: ICD-10-CM

## 2019-12-06 PROCEDURE — 11730 AVULSION NAIL PLATE SIMPLE 1: CPT | Mod: TA

## 2019-12-06 PROCEDURE — 25000128 H RX IP 250 OP 636

## 2019-12-06 PROCEDURE — 99283 EMERGENCY DEPT VISIT LOW MDM: CPT | Mod: 25

## 2019-12-06 RX ORDER — CEPHALEXIN 500 MG/1
500 CAPSULE ORAL 4 TIMES DAILY
Qty: 28 CAPSULE | Refills: 0 | Status: SHIPPED | OUTPATIENT
Start: 2019-12-06 | End: 2020-01-30

## 2019-12-06 RX ORDER — BUPIVACAINE HYDROCHLORIDE 5 MG/ML
INJECTION, SOLUTION PERINEURAL
Status: COMPLETED
Start: 2019-12-06 | End: 2019-12-06

## 2019-12-06 RX ADMIN — BUPIVACAINE HYDROCHLORIDE: 5 INJECTION, SOLUTION PERINEURAL at 04:30

## 2019-12-06 ASSESSMENT — MIFFLIN-ST. JEOR: SCORE: 1616.81

## 2019-12-06 NOTE — ED AVS SNAPSHOT
[facility] Emergency Department  [address]  [facility] [address]  Phone:  [phone]  Fax:  [phone]                                    Misbah Thompson   MRN: [id number]    Department:  [facility] Emergency Department   Date of Visit:  12/6/2019           After Visit Summary Signature Page    I have received my discharge instructions, and my questions have been answered. I have discussed any challenges I see with this plan with the nurse or doctor.    ..........................................................................................................................................  Patient/Patient Representative Signature      ..........................................................................................................................................  Patient Representative Print Name and Relationship to Patient    ..................................................               ................................................  Date                                   Time    ..........................................................................................................................................  Reviewed by Signature/Title    ...................................................              ..............................................  Date                                               Time          22EPIC Rev 08/18

## 2019-12-06 NOTE — ED PROVIDER NOTES
"  History     Chief Complaint:  Toe Pain    The history is provided by the patient.      Misbah Thompson is a 19 year old otherwise male who presents to the emergency department today for evaluation of left great toe pain. The patient reports that for the past two days he has had a sharp and burning pain in his left great toe. He has noticed pus and blood draining from the toenail and has attempted to drain it on his own at home. The patient states he woke up this morning around 0230 with significant pain, prompting his presentation here in the emergency department.     Allergies:  No Known Drug Allergies     Medications:   Accutane     Past Medical History:    Medical history reviewed. No pertinent history was found.    Past Surgical History:    Surgical history reviewed. No pertinent surgical history.    Family History:    Family history reviewed. No pertinent family history.    Social History:  The patient was accompanied to the ED by his mother.  Smoking Status: Never Smoker  Smokeless Tobacco: Never Used  Alcohol Use: Negative   Drug Use: Negative    Marital Status:  Single     Review of Systems   Musculoskeletal:        Great toe pain, left   All other systems reviewed and are negative.      Physical Exam     Patient Vitals for the past 24 hrs:   BP Temp Temp src Pulse Resp SpO2 Height Weight   12/06/19 0345 123/72 -- -- 74 -- 96 % -- --   12/06/19 0313 133/103 98  F (36.7  C) Oral 88 18 95 % 1.803 m (5' 11\") 59 kg (130 lb)      Physical Exam  Nursing note and vitals reviewed.  Constitutional: Cooperative.   Pulmonary/Chest: Effort normal  Musculoskeletal:  Medial aspect of left great toe was ingrown with decompressed paronychia.   Neurological: Alert.   Skin: Skin is warm and dry.   Psychiatric: Normal mood and affect.      Emergency Department Course     Procedures:    DIGITAL BLOCK  INDICATION: Anesthesia prior to toenail excision  LOCATION:  Left great toe  ANESTHESIA: Regional block using 0.5% " Bupivicaine  PROCEDURE NOTE: The patient's left great toe was blocked with the above. He tolerated the procedure well and there were no complications.      MEDIAL TOENAIL EXCISION  INDICATION: Ingrown toenail with infection  CONSENT: Verbal consent was obtained from the patient prior to the procedure. Risks and benefits explained, questions answered.   LOCATION: Left great toe  PROCEDURE NOTE: The toe was anesthetized using a digital block with 0.5% Bupivacaine in a normal fashion. Skin was cleaned with Shur-Cleanse. Using blunt dissection technique I was able to lift the medial left third of the toenail off of the nailbed. Sharp dissection was used to excise the medial third of the toenail and then was removed with a clamp. The patient tolerated the procedure well with no complications. The wound was covered with Bacitracin and sterile dressings.       Emergency Department Course:  0318 Nursing notes and vitals reviewed.  0324 I performed an exam of the patient as documented above.   0345 I excised a portion of the patient's toenail as detailed above. on the patient as detailed above.     Findings and plan explained to the Patient and mother. Patient discharged home with instructions regarding supportive care, medications, and reasons to return. The importance of close follow-up was reviewed. The patient was prescribed Keflex.     I personally reviewed the treatment plan with the Patient and mother and answered all related questions prior to discharge.     Impression & Plan      Medical Decision Making:  Misbah Thompson is a 19 year old gentleman with an ingrown left great toe with infection. Excision of the medial aspect was preformed as per the procedure note. He will be placed on antibiotics and follow up with his regular physician as needed.     Diagnosis:    ICD-10-CM    1. Ingrown great toenail of left foot with infection L60.0        Disposition:  The patient is discharged to home.     Discharge  Medications:  New Prescriptions    CEPHALEXIN (KEFLEX) 500 MG CAPSULE    Take 1 capsule (500 mg) by mouth 4 times daily for 7 days       Scribe Disclosure:  I, Myrna Ralph, am serving as a scribe at 3:24 AM on 12/6/2019 to document services personally performed by Mika Murdock MD based on my observations and the provider's statements to me.    12/6/2019   [facility] EMERGENCY DEPARTMENT       Mika Murdock MD  12/06/19 0609

## 2019-12-06 NOTE — ED TRIAGE NOTES
Here for left great toe. Has ingrown toenail for 2 days. Woke at 1:30am with worsening pain. Per patient, did try digging his toenail. ABCs intact.

## 2019-12-06 NOTE — LETTER
December 6, 2019      To Whom It May Concern:      Misbah Thompson was seen in our Emergency Department today, 12/06/19.  I expect his condition to improve over the next 2 days. He may return to work when improved.    Sincerely,        Honorio Tinoco RN

## 2020-01-29 NOTE — PROGRESS NOTES
[facility] - PRIMARY CARE SKIN    CC: recalcitrant acne, oral isotretinoin therapy  SUBJECTIVE:   Misbah Thompson is a(n) 19 year old male who presents to clinic today for follow-up of recalcitrant acne.     Pt reports he was sick with an unrelated illness for a few weeks and was unable to make it in to his appointment for Month #6. PT has been off of oral isotretinoin for 2-3 weeks. PT reports he has been moisturizing. Pt would like to continue. He will be resigning iPledge agreement.     Oral isotretinoin therapy monitoring    Months completed: 5. Pt was not seen for month 6 due to unrelated illness.   Last month dosage: 40 mg BID.    Treatment response over the last month: few breakout on the face and chest.        Side effects noted:    Dryness: YES but manageable.    Arthralgias/myalgias: No     Mood changes: NO.    Other: None. Had rash on arms that resolved.     PHQ-2 Score:   PHQ-2 ( 1999 Pfizer) 9/5/2019 8/5/2019   Q1: Little interest or pleasure in doing things 0 0   Q2: Feeling down, depressed or hopeless 0 0   PHQ-2 Score 0 0     iPLEDGE #: [id number]    Refer to electronic medical record (EMR) for past medical history and medications.    INTEGUMENTARY/SKIN: POSITIVE for acne  ROS: 14 point review of systems was negative except the symptoms listed above in the HPI.    This document serves as a record of the services and decisions personally performed and made by Blanca Anand MD and was created by Lloyd Ramirez, a trained medical scribe, based on personal observations and provider statements to the medical scribe.  January 30, 2020 10:03 AM   Lloyd Ramirez    OBJECTIVE:     Wt Readings from Last 2 Encounters:   12/06/19 130 lb (59 kg)   09/05/19 127 lb (57.6 kg)     GENERAL: healthy, alert and no distress.  SKIN: Lebron Skin Type - II.  Face, Neck and Trunk examined. The dermatoscope was used to help evaluate pigmented lesions.  Skin Pertinent Findings:  Face: Residual scarring,  moderate.   Chest: Few scattered inflammatory papules  Back and shoulders: Residual scarring, moderate.     Diagnostic Test Results:  Monitoring CBC, Lipid Profile, AST, (hCG if female).    ASSESSMENT:     No diagnosis found.  Comment: severe acne, oral isotretinoin treatment with monthly monitoring.  MDM: Side effects of oral isotretinoin reviewed - dryness of the skin and mucous membranes, arthralgias, myalgias, mood changes. Discussed potential flare of the acne.    PLAN:   There are no Patient Instructions on file for this visit.    When on oral isotretinoin, discontinue all other acne medications. Discussed the importance of sticking with the oral isotretinoin therapy program, not picking or excoriating.    Oral isotretinoin discussed fully with the patient.  Oral isotretinoin is a very effective drug to treat acne vulgaris but has many significant side effects. Chief among these are teratogenesis, hepatic injury, dyslipidemia and severe drying of the mucous membranes. All of these issues have been discussed in detail. Monthly blood tests to monitor lipids and liver functions will be necessary. Expect painful dryness and/or fissuring around the lips, eyes, and other moist areas of the body. Balms may be protective. Contact lenses may be too painful to wear temporarily while on this drug. Episodes of significant depression have been reported, including suicidal ideation and attempts in rare cases. It may also cause pseudotumor cerebri and hyperostosis. The patient will report any such changes in mood, depressive symptoms or suicidal thoughts, headaches, joint or bone pains.    Female patients MUST use two simultaneous methods of family planning. Accutane is Category X for pregnancy, meaning it will cause fetal teratogenic malformations, and pregnancy MUST be avoided while on this drug. For that reason, the patient is admonished to never share the medication.    The dose is 0.5-1 mg/kg in two divided doses for  15-20 weeks.    After discussion of these important issues, he indicates complete understanding of all of the above, and Does wish to proceed with accutane therapy.      Goal dosage: 8640 mg = 59 kg (actual weight) * 150 mg/kg.    Dose:  Month #6.  Oral isotretinoin dosage: 30 mg po BID.    Previous oral isotretinoin dosing:  Month #1 (prescribed on 8/5/2019): 20 mg po BID * 30 days = 1200 mg.  Month #2 (prescribed on 9/5/2019): 30 mg po BID * 30 days = 1800 mg.  Month #3 (prescribed on 10/3/2019): 40 mg po BID * 30 days = 2400 mg.  Month #4 (prescribed on 11/04/2019): 40 mg po BID * 30 days = 2400 mg  Month #5 (prescribed on 12/02/2019): 40 mg po BID * 30 days = 2400 mg  Total dose to date: 10,200 mg    RTC in one month for follow up, or sooner prn, with laboratory studies completed.    TT: 20 minutes.  CT: 15 minutes.    The information in this document, created by the medical scribe for me, accurately reflects the services I personally performed and the decisions made by me. I have reviewed and approved this document for accuracy prior to leaving the patient care area.  January 30, 2020 10:03 AM  Blanca Anand MD  [facility] [place]

## 2020-01-30 ENCOUNTER — OFFICE VISIT (OUTPATIENT)
Dept: FAMILY MEDICINE | Facility: CLINIC | Age: 20
End: 2020-01-30
Payer: COMMERCIAL

## 2020-01-30 VITALS — SYSTOLIC BLOOD PRESSURE: 112 MMHG | DIASTOLIC BLOOD PRESSURE: 64 MMHG

## 2020-01-30 DIAGNOSIS — L70.0 ACNE VULGARIS: Primary | ICD-10-CM

## 2020-01-30 PROCEDURE — 99213 OFFICE O/P EST LOW 20 MIN: CPT | Performed by: FAMILY MEDICINE

## 2020-01-30 RX ORDER — ISOTRETINOIN 30 MG/1
CAPSULE ORAL
Qty: 60 CAPSULE | Refills: 0 | Status: SHIPPED | OUTPATIENT
Start: 2020-01-30 | End: 2020-03-30 | Stop reason: DRUGHIGH

## 2020-01-30 NOTE — LETTER
1/30/2020         RE: Misbah Thompson  [id number] [address] [address]        Dear Colleague,    Thank you for referring your patient, Misbah Thompson, to the [facility] [place]. Please see a copy of my visit note below.    [facility] - PRIMARY CARE SKIN    CC: recalcitrant acne, oral isotretinoin therapy  SUBJECTIVE:   Misbah Thompson is a(n) 19 year old male who presents to clinic today for follow-up of recalcitrant acne.     Pt reports he was sick with an unrelated illness for a few weeks and was unable to make it in to his appointment for Month #6. PT has been off of oral isotretinoin for 2-3 weeks. PT reports he has been moisturizing. Pt would like to continue. He will be resigning iPledge agreement.     Oral isotretinoin therapy monitoring    Months completed: 5. Pt was not seen for month 6 due to unrelated illness.   Last month dosage: 40 mg BID.    Treatment response over the last month: few breakout on the face and chest.        Side effects noted:    Dryness: YES but manageable.    Arthralgias/myalgias: No     Mood changes: NO.    Other: None. Had rash on arms that resolved.     PHQ-2 Score:   PHQ-2 ( 1999 Pfizer) 9/5/2019 8/5/2019   Q1: Little interest or pleasure in doing things 0 0   Q2: Feeling down, depressed or hopeless 0 0   PHQ-2 Score 0 0     iPLEDGE #: [id number]    Refer to electronic medical record (EMR) for past medical history and medications.    INTEGUMENTARY/SKIN: POSITIVE for acne  ROS: 14 point review of systems was negative except the symptoms listed above in the HPI.    This document serves as a record of the services and decisions personally performed and made by Blanca Anand MD and was created by Lloyd Ramirez, a trained medical scribe, based on personal observations and provider statements to the medical scribe.  January 30, 2020 10:03 AM   Lloyd Ramirez    OBJECTIVE:     Wt Readings from Last 2 Encounters:   12/06/19 130 lb (59  kg)   09/05/19 127 lb (57.6 kg)     GENERAL: healthy, alert and no distress.  SKIN: Lebron Skin Type - II.  Face, Neck and Trunk examined. The dermatoscope was used to help evaluate pigmented lesions.  Skin Pertinent Findings:  Face: Residual scarring, moderate.   Chest: Few scattered inflammatory papules  Back and shoulders: Residual scarring, moderate.     Diagnostic Test Results:  Monitoring CBC, Lipid Profile, AST, (hCG if female).    ASSESSMENT:     No diagnosis found.  Comment: severe acne, oral isotretinoin treatment with monthly monitoring.  MDM: Side effects of oral isotretinoin reviewed - dryness of the skin and mucous membranes, arthralgias, myalgias, mood changes. Discussed potential flare of the acne.    PLAN:   There are no Patient Instructions on file for this visit.    When on oral isotretinoin, discontinue all other acne medications. Discussed the importance of sticking with the oral isotretinoin therapy program, not picking or excoriating.    Oral isotretinoin discussed fully with the patient.  Oral isotretinoin is a very effective drug to treat acne vulgaris but has many significant side effects. Chief among these are teratogenesis, hepatic injury, dyslipidemia and severe drying of the mucous membranes. All of these issues have been discussed in detail. Monthly blood tests to monitor lipids and liver functions will be necessary. Expect painful dryness and/or fissuring around the lips, eyes, and other moist areas of the body. Balms may be protective. Contact lenses may be too painful to wear temporarily while on this drug. Episodes of significant depression have been reported, including suicidal ideation and attempts in rare cases. It may also cause pseudotumor cerebri and hyperostosis. The patient will report any such changes in mood, depressive symptoms or suicidal thoughts, headaches, joint or bone pains.    Female patients MUST use two simultaneous methods of family planning. Accutane is  Category X for pregnancy, meaning it will cause fetal teratogenic malformations, and pregnancy MUST be avoided while on this drug. For that reason, the patient is admonished to never share the medication.    The dose is 0.5-1 mg/kg in two divided doses for 15-20 weeks.    After discussion of these important issues, he indicates complete understanding of all of the above, and Does wish to proceed with accutane therapy.      Goal dosage: 8640 mg = 59 kg (actual weight) * 150 mg/kg.    Dose:  Month #6.  Oral isotretinoin dosage: 30 mg po BID.    Previous oral isotretinoin dosing:  Month #1 (prescribed on 8/5/2019): 20 mg po BID * 30 days = 1200 mg.  Month #2 (prescribed on 9/5/2019): 30 mg po BID * 30 days = 1800 mg.  Month #3 (prescribed on 10/3/2019): 40 mg po BID * 30 days = 2400 mg.  Month #4 (prescribed on 11/04/2019): 40 mg po BID * 30 days = 2400 mg  Month #5 (prescribed on 12/02/2019): 40 mg po BID * 30 days = 2400 mg  Total dose to date: 10,200 mg    RTC in one month for follow up, or sooner prn, with laboratory studies completed.    TT: 20 minutes.  CT: 15 minutes.    The information in this document, created by the medical scribe for me, accurately reflects the services I personally performed and the decisions made by me. I have reviewed and approved this document for accuracy prior to leaving the patient care area.  January 30, 2020 10:03 AM  Blanca Anand MD  [facility] [place]        Again, thank you for allowing me to participate in the care of your patient.        Sincerely,        Blanca Anand MD

## 2020-01-30 NOTE — PATIENT INSTRUCTIONS
"FUTURE APPOINTMENTS  Follow up in 28-31 days.    ORAL ISOTRETINOIN INSTRUCTIONS  CURRENT DOSAGE: Take by mouth One 30 mg tablet, two times a day.      Stop all other acne products, except you may use only Cetaphil or CeraVe facial cleanser.    Take the isotretinoin with a fatty meal (such as peanut butter or yogurt) to improve the absorption of the medication.  CERAVE CREAM  OFFICE VISIT INSTRUCTIONS    Schedule follow-up appointments every 28-31 days.    Bring iPledge ID# and PASSWORD with you to each office visit. Make sure you have obtained your password before the next office visit.    IF YOU HAVE NOT RECEIVED YOUR PASSWORD IN THE MAIL IN 2 WEEKS, CALL IPLEDGE.    You will need to do a lab blood draw every month:    Schedule blood draw to be completed 1-2 days prior to each office visit  You may complete these at any [facility] lab; just remember to schedule it before you go.      If you are being seen elsewhere by a dermatologist for oral isotretinoin monitoring, be sure to go into iPledge for \"transfer of care\" for the appropriate physician.    Make sure to always  your medication within 3 days of prescription being sent to the pharmacy    Check with your insurance company for coverage of oral isotretinoin therapy for recalcitrant acne. Ask if they have a preferred brand of oral isotretinoin (e.g. Claravis, Myorisan, Zenatane, Amnesteem, Sotret)    RECOMMENDATIONS FOR DRYNESS    Moisturizer : Cetaphil facial moisturizer.    Nasal mist spray : Ocean brand. Use at bedtime.    Do not use sommer-synephrine.    Lips : Aquaphor ointment, Vaseline jelly, or Vanicream lip protectant for the dryness on the lips.    Eye drops : Refresh tears saline eye drops for dry eye symptoms. Consider also use of gel eye drops at bedtime if excessive eye dryness.    Due to dryness of mouth, floss daily and brush teeth at least twice daily.    Consider supplementation of omega 3 oil 1 gram/day.    Make sure to apply " sunscreen regularly.

## 2020-02-25 NOTE — PROGRESS NOTES
[facility] - PRIMARY CARE SKIN    CC: recalcitrant acne, oral isotretinoin therapy  SUBJECTIVE:   Misbah Thompson is a(n) 19 year old male who presents to clinic today for follow-up of recalcitrant acne.         Oral isotretinoin therapy monitoring    Months completed: 6  ( Pt was not seen for month 6 due to unrelated illness. )  Last month dosage: 30 mg BID.    Treatment response over the last month: few breakout on the face and chest.        Side effects noted:    Dryness: YES but manageable.    Arthralgias/myalgias: No     Mood changes: NO.    Other: None. Had rash on arms that resolved.     PHQ-2 Score:   PHQ-2 ( 1999 Pfizer) 1/30/2020 9/5/2019   Q1: Little interest or pleasure in doing things 0 0   Q2: Feeling down, depressed or hopeless 0 0   PHQ-2 Score 0 0     iPLEDGE #: [id number]    Refer to electronic medical record (EMR) for past medical history and medications.    INTEGUMENTARY/SKIN: POSITIVE for acne  ROS: 14 point review of systems was negative except the symptoms listed above in the HPI.      OBJECTIVE:     Wt Readings from Last 2 Encounters:   12/06/19 130 lb (59 kg)   09/05/19 127 lb (57.6 kg)     GENERAL: healthy, alert and no distress.  SKIN: Lebron Skin Type - II.  Face, Neck and Trunk examined. The dermatoscope was used to help evaluate pigmented lesions.  Skin Pertinent Findings:  Face: Residual scarring, moderate. Single papule  Chest: no inflammatory papules   Back and shoulders: Residual scarring, moderate.     Diagnostic Test Results:  Monitoring CBC, Lipid Profile, AST, (hCG if female).    ASSESSMENT:     Encounter Diagnoses   Name Primary?     Acne vulgaris Yes     Encounter for long-term (current) use of medications      Comment: severe acne, oral isotretinoin treatment with monthly monitoring.  MDM: Side effects of oral isotretinoin reviewed - dryness of the skin and mucous membranes, arthralgias, myalgias, mood changes. Discussed potential flare of the acne.    PLAN:    There are no Patient Instructions on file for this visit.    When on oral isotretinoin, discontinue all other acne medications. Discussed the importance of sticking with the oral isotretinoin therapy program, not picking or excoriating.    Oral isotretinoin discussed fully with the patient.  Oral isotretinoin is a very effective drug to treat acne vulgaris but has many significant side effects. Chief among these are teratogenesis, hepatic injury, dyslipidemia and severe drying of the mucous membranes. All of these issues have been discussed in detail. Monthly blood tests to monitor lipids and liver functions will be necessary. Expect painful dryness and/or fissuring around the lips, eyes, and other moist areas of the body. Balms may be protective. Contact lenses may be too painful to wear temporarily while on this drug. Episodes of significant depression have been reported, including suicidal ideation and attempts in rare cases. It may also cause pseudotumor cerebri and hyperostosis. The patient will report any such changes in mood, depressive symptoms or suicidal thoughts, headaches, joint or bone pains.    Female patients MUST use two simultaneous methods of family planning. Accutane is Category X for pregnancy, meaning it will cause fetal teratogenic malformations, and pregnancy MUST be avoided while on this drug. For that reason, the patient is admonished to never share the medication.    The dose is 0.5-1 mg/kg in two divided doses for 15-20 weeks.    After discussion of these important issues, he indicates complete understanding of all of the above, and Does wish to proceed with accutane therapy.      Goal dosage: 8640 mg = 59 kg (actual weight) * 150 mg/kg.    Dose:  Month #6.  Oral isotretinoin dosage: 40 mg bid    Previous oral isotretinoin dosing:  Month #1 (prescribed on 8/5/2019): 20 mg po BID * 30 days = 1200 mg.  Month #2 (prescribed on 9/5/2019): 30 mg po BID * 30 days = 1800 mg.  Month #3  (prescribed on 10/3/2019): 40 mg po BID * 30 days = 2400 mg.  Month #4 (prescribed on 11/04/2019): 40 mg po BID * 30 days = 2400 mg  Month #5 (prescribed on 12/02/2019): 40 mg po BID * 30 days = 2400 mg  Month #6 (prescribed on 01/30/2020): 30 mg po BID * 30 days =1800 mg  Total dose to date: 04268 mg    RTC in one month for follow up, or sooner prn, with laboratory studies completed.    TT: 20 minutes.  CT: 15 minutes.

## 2020-02-26 ENCOUNTER — OFFICE VISIT (OUTPATIENT)
Dept: FAMILY MEDICINE | Facility: CLINIC | Age: 20
End: 2020-02-26
Payer: COMMERCIAL

## 2020-02-26 VITALS — SYSTOLIC BLOOD PRESSURE: 122 MMHG | DIASTOLIC BLOOD PRESSURE: 70 MMHG

## 2020-02-26 DIAGNOSIS — Z79.899 LONG-TERM CURRENT USE OF ISOTRETINOIN: ICD-10-CM

## 2020-02-26 DIAGNOSIS — Z79.899 ENCOUNTER FOR LONG-TERM (CURRENT) USE OF MEDICATIONS: ICD-10-CM

## 2020-02-26 DIAGNOSIS — Z79.899 ENCOUNTER FOR LONG-TERM (CURRENT) USE OF HIGH-RISK MEDICATION: ICD-10-CM

## 2020-02-26 DIAGNOSIS — L30.9 ECZEMA, UNSPECIFIED TYPE: ICD-10-CM

## 2020-02-26 DIAGNOSIS — L70.0 ACNE VULGARIS: Primary | ICD-10-CM

## 2020-02-26 LAB
BASOPHILS # BLD AUTO: 0 10E9/L (ref 0–0.2)
BASOPHILS NFR BLD AUTO: 0.3 %
DIFFERENTIAL METHOD BLD: NORMAL
EOSINOPHIL # BLD AUTO: 0.2 10E9/L (ref 0–0.7)
EOSINOPHIL NFR BLD AUTO: 2.6 %
ERYTHROCYTE [DISTWIDTH] IN BLOOD BY AUTOMATED COUNT: 13.2 % (ref 10–15)
HCT VFR BLD AUTO: 45 % (ref 40–53)
HGB BLD-MCNC: 15.1 G/DL (ref 13.3–17.7)
LYMPHOCYTES # BLD AUTO: 2.2 10E9/L (ref 0.8–5.3)
LYMPHOCYTES NFR BLD AUTO: 35.5 %
MCH RBC QN AUTO: 28.1 PG (ref 26.5–33)
MCHC RBC AUTO-ENTMCNC: 33.6 G/DL (ref 31.5–36.5)
MCV RBC AUTO: 84 FL (ref 78–100)
MONOCYTES # BLD AUTO: 0.5 10E9/L (ref 0–1.3)
MONOCYTES NFR BLD AUTO: 8.5 %
NEUTROPHILS # BLD AUTO: 3.3 10E9/L (ref 1.6–8.3)
NEUTROPHILS NFR BLD AUTO: 53.1 %
PLATELET # BLD AUTO: 197 10E9/L (ref 150–450)
RBC # BLD AUTO: 5.37 10E12/L (ref 4.4–5.9)
WBC # BLD AUTO: 6.2 10E9/L (ref 4–11)

## 2020-02-26 PROCEDURE — 84450 TRANSFERASE (AST) (SGOT): CPT | Performed by: FAMILY MEDICINE

## 2020-02-26 PROCEDURE — 99213 OFFICE O/P EST LOW 20 MIN: CPT | Performed by: FAMILY MEDICINE

## 2020-02-26 PROCEDURE — 80061 LIPID PANEL: CPT | Performed by: FAMILY MEDICINE

## 2020-02-26 PROCEDURE — 36415 COLL VENOUS BLD VENIPUNCTURE: CPT | Performed by: FAMILY MEDICINE

## 2020-02-26 PROCEDURE — 85025 COMPLETE CBC W/AUTO DIFF WBC: CPT | Performed by: FAMILY MEDICINE

## 2020-02-26 RX ORDER — TRIAMCINOLONE ACETONIDE 1 MG/G
CREAM TOPICAL 2 TIMES DAILY
Qty: 80 G | Refills: 0 | Status: SHIPPED | OUTPATIENT
Start: 2020-02-26

## 2020-02-26 RX ORDER — ISOTRETINOIN 40 MG/1
40 CAPSULE ORAL 2 TIMES DAILY
Qty: 60 CAPSULE | Refills: 0 | Status: SHIPPED | OUTPATIENT
Start: 2020-02-26 | End: 2020-03-30

## 2020-02-26 NOTE — LETTER
2/26/2020         RE: Misbah Thompson  [id number] [address] [address]        Dear Colleague,    Thank you for referring your patient, Misbah Thompson, to the [facility]. Please see a copy of my visit note below.    [facility] - PRIMARY CARE SKIN    CC: recalcitrant acne, oral isotretinoin therapy  SUBJECTIVE:   Misbah Thompson is a(n) 19 year old male who presents to clinic today for follow-up of recalcitrant acne.         Oral isotretinoin therapy monitoring    Months completed: 6  ( Pt was not seen for month 6 due to unrelated illness. )  Last month dosage: 30 mg BID.    Treatment response over the last month: few breakout on the face and chest.        Side effects noted:    Dryness: YES but manageable.    Arthralgias/myalgias: No     Mood changes: NO.    Other: None. Had rash on arms that resolved.     PHQ-2 Score:   PHQ-2 ( 1999 Pfizer) 1/30/2020 9/5/2019   Q1: Little interest or pleasure in doing things 0 0   Q2: Feeling down, depressed or hopeless 0 0   PHQ-2 Score 0 0     iPLEDGE #: [id number]    Refer to electronic medical record (EMR) for past medical history and medications.    INTEGUMENTARY/SKIN: POSITIVE for acne  ROS: 14 point review of systems was negative except the symptoms listed above in the HPI.      OBJECTIVE:     Wt Readings from Last 2 Encounters:   12/06/19 130 lb (59 kg)   09/05/19 127 lb (57.6 kg)     GENERAL: healthy, alert and no distress.  SKIN: Lebron Skin Type - II.  Face, Neck and Trunk examined. The dermatoscope was used to help evaluate pigmented lesions.  Skin Pertinent Findings:  Face: Residual scarring, moderate. Single papule  Chest: no inflammatory papules   Back and shoulders: Residual scarring, moderate.     Diagnostic Test Results:  Monitoring CBC, Lipid Profile, AST, (hCG if female).    ASSESSMENT:     Encounter Diagnoses   Name Primary?     Acne vulgaris Yes     Encounter for long-term (current) use of medications       Comment: severe acne, oral isotretinoin treatment with monthly monitoring.  MDM: Side effects of oral isotretinoin reviewed - dryness of the skin and mucous membranes, arthralgias, myalgias, mood changes. Discussed potential flare of the acne.    PLAN:   There are no Patient Instructions on file for this visit.    When on oral isotretinoin, discontinue all other acne medications. Discussed the importance of sticking with the oral isotretinoin therapy program, not picking or excoriating.    Oral isotretinoin discussed fully with the patient.  Oral isotretinoin is a very effective drug to treat acne vulgaris but has many significant side effects. Chief among these are teratogenesis, hepatic injury, dyslipidemia and severe drying of the mucous membranes. All of these issues have been discussed in detail. Monthly blood tests to monitor lipids and liver functions will be necessary. Expect painful dryness and/or fissuring around the lips, eyes, and other moist areas of the body. Balms may be protective. Contact lenses may be too painful to wear temporarily while on this drug. Episodes of significant depression have been reported, including suicidal ideation and attempts in rare cases. It may also cause pseudotumor cerebri and hyperostosis. The patient will report any such changes in mood, depressive symptoms or suicidal thoughts, headaches, joint or bone pains.    Female patients MUST use two simultaneous methods of family planning. Accutane is Category X for pregnancy, meaning it will cause fetal teratogenic malformations, and pregnancy MUST be avoided while on this drug. For that reason, the patient is admonished to never share the medication.    The dose is 0.5-1 mg/kg in two divided doses for 15-20 weeks.    After discussion of these important issues, he indicates complete understanding of all of the above, and Does wish to proceed with accutane therapy.      Goal dosage: 8640 mg = 59 kg (actual weight) * 150  mg/kg.    Dose:  Month #6.  Oral isotretinoin dosage: 40 mg bid    Previous oral isotretinoin dosing:  Month #1 (prescribed on 8/5/2019): 20 mg po BID * 30 days = 1200 mg.  Month #2 (prescribed on 9/5/2019): 30 mg po BID * 30 days = 1800 mg.  Month #3 (prescribed on 10/3/2019): 40 mg po BID * 30 days = 2400 mg.  Month #4 (prescribed on 11/04/2019): 40 mg po BID * 30 days = 2400 mg  Month #5 (prescribed on 12/02/2019): 40 mg po BID * 30 days = 2400 mg  Month #6 (prescribed on 01/30/2020): 30 mg po BID * 30 days =1800 mg  Total dose to date: 38225 mg    RTC in one month for follow up, or sooner prn, with laboratory studies completed.    TT: 20 minutes.  CT: 15 minutes.        Again, thank you for allowing me to participate in the care of your patient.        Sincerely,        Blanca Anand MD

## 2020-02-26 NOTE — PATIENT INSTRUCTIONS
Isotretinoin 40 mg two times per day  Recheck in one month    For arm rash :     Triamcinolone 0.1% cream apply two times per day for 10-14 days

## 2020-02-27 ENCOUNTER — TELEPHONE (OUTPATIENT)
Dept: FAMILY MEDICINE | Facility: CLINIC | Age: 20
End: 2020-02-27

## 2020-02-27 LAB
AST SERPL W P-5'-P-CCNC: 52 U/L (ref 0–45)
CHOLEST SERPL-MCNC: 175 MG/DL
HDLC SERPL-MCNC: 33 MG/DL
LDLC SERPL CALC-MCNC: 124 MG/DL
NONHDLC SERPL-MCNC: 142 MG/DL
TRIGL SERPL-MCNC: 90 MG/DL

## 2020-02-27 NOTE — TELEPHONE ENCOUNTER
Patient notified of test results and providers message, patient has no further questions.  States he has drank alcohol but no tylenol type medication or virus'. patinet will avoid alcohol for the next week  [facility]  Yamilet COATES RN BSN  [facility]  [phone]

## 2020-02-27 NOTE — TELEPHONE ENCOUNTER
----- Message from Blanca Anand MD sent at 2/27/2020 11:50 AM CST -----  Please call :    Slight elevation of AST, this elevation may not be significant but would like to recheck the AST next week.     Thank you,   Blanca Anand M.D.

## 2020-02-27 NOTE — TELEPHONE ENCOUNTER
Left message on answering machine for patient to call back.        Yamilet VEGARN BSN  [facility]  [phone]  [facility]  [phone]

## 2020-03-06 DIAGNOSIS — Z79.899 LONG-TERM CURRENT USE OF ISOTRETINOIN: ICD-10-CM

## 2020-03-06 DIAGNOSIS — L70.0 ACNE VULGARIS: ICD-10-CM

## 2020-03-06 DIAGNOSIS — Z79.899 ENCOUNTER FOR LONG-TERM (CURRENT) USE OF HIGH-RISK MEDICATION: ICD-10-CM

## 2020-03-06 LAB
AST SERPL W P-5'-P-CCNC: 24 U/L (ref 0–45)
BASOPHILS # BLD AUTO: 0 10E9/L (ref 0–0.2)
BASOPHILS NFR BLD AUTO: 0.3 %
CHOLEST SERPL-MCNC: 157 MG/DL
DIFFERENTIAL METHOD BLD: NORMAL
EOSINOPHIL # BLD AUTO: 0.1 10E9/L (ref 0–0.7)
EOSINOPHIL NFR BLD AUTO: 1.9 %
ERYTHROCYTE [DISTWIDTH] IN BLOOD BY AUTOMATED COUNT: 12.5 % (ref 10–15)
HCT VFR BLD AUTO: 45.1 % (ref 40–53)
HDLC SERPL-MCNC: 32 MG/DL
HGB BLD-MCNC: 15 G/DL (ref 13.3–17.7)
LDLC SERPL CALC-MCNC: 95 MG/DL
LYMPHOCYTES # BLD AUTO: 2.4 10E9/L (ref 0.8–5.3)
LYMPHOCYTES NFR BLD AUTO: 37.9 %
MCH RBC QN AUTO: 28.2 PG (ref 26.5–33)
MCHC RBC AUTO-ENTMCNC: 33.3 G/DL (ref 31.5–36.5)
MCV RBC AUTO: 85 FL (ref 78–100)
MONOCYTES # BLD AUTO: 0.5 10E9/L (ref 0–1.3)
MONOCYTES NFR BLD AUTO: 7.3 %
NEUTROPHILS # BLD AUTO: 3.3 10E9/L (ref 1.6–8.3)
NEUTROPHILS NFR BLD AUTO: 52.6 %
NONHDLC SERPL-MCNC: 125 MG/DL
PLATELET # BLD AUTO: 179 10E9/L (ref 150–450)
RBC # BLD AUTO: 5.32 10E12/L (ref 4.4–5.9)
TRIGL SERPL-MCNC: 148 MG/DL
WBC # BLD AUTO: 6.3 10E9/L (ref 4–11)

## 2020-03-06 PROCEDURE — 80061 LIPID PANEL: CPT | Performed by: FAMILY MEDICINE

## 2020-03-06 PROCEDURE — 85025 COMPLETE CBC W/AUTO DIFF WBC: CPT | Performed by: FAMILY MEDICINE

## 2020-03-06 PROCEDURE — 84450 TRANSFERASE (AST) (SGOT): CPT | Performed by: FAMILY MEDICINE

## 2020-03-06 PROCEDURE — 36415 COLL VENOUS BLD VENIPUNCTURE: CPT | Performed by: FAMILY MEDICINE

## 2020-03-09 ENCOUNTER — TELEPHONE (OUTPATIENT)
Dept: FAMILY MEDICINE | Facility: CLINIC | Age: 20
End: 2020-03-09

## 2020-03-09 NOTE — TELEPHONE ENCOUNTER
Letter sent- Left message on answering machine for patient to call back for over the phone resutls.    .Yamilet COATESRN BSN  [facility]  [phone]

## 2020-03-09 NOTE — TELEPHONE ENCOUNTER
----- Message from Blanca Anand MD sent at 3/9/2020 11:48 AM CDT -----  Please call :     Lab work looks good.     Thank you,   Blanca Anand M.D.

## 2020-03-09 NOTE — LETTER
March 9, 2020    Misbah Thompson  [address] [address]  [place] [address]        Dear Misbah,    This is a letter regarding your completed lab results. The tested values are below and were normal for medication.    Orders Only on 03/06/2020   Component Date Value Ref Range Status     Cholesterol 03/06/2020 157  <200 mg/dL Final     Triglycerides 03/06/2020 148  <150 mg/dL Final     HDL Cholesterol 03/06/2020 32* >39 mg/dL Final     LDL Cholesterol Calculated 03/06/2020 95  <100 mg/dL Final     Non HDL Cholesterol 03/06/2020 125  <130 mg/dL Final     WBC 03/06/2020 6.3  4.0 - 11.0 10e9/L Final     RBC Count 03/06/2020 5.32  4.4 - 5.9 10e12/L Final     Hemoglobin 03/06/2020 15.0  13.3 - 17.7 g/dL Final     Hematocrit 03/06/2020 45.1  40.0 - 53.0 % Final     MCV 03/06/2020 85  78 - 100 fl Final     MCH 03/06/2020 28.2  26.5 - 33.0 pg Final     MCHC 03/06/2020 33.3  31.5 - 36.5 g/dL Final     RDW 03/06/2020 12.5  10.0 - 15.0 % Final     Platelet Count 03/06/2020 179  150 - 450 10e9/L Final     % Neutrophils 03/06/2020 52.6  % Final     % Lymphocytes 03/06/2020 37.9  % Final     % Monocytes 03/06/2020 7.3  % Final     % Eosinophils 03/06/2020 1.9  % Final     % Basophils 03/06/2020 0.3  % Final     Absolute Neutrophil 03/06/2020 3.3  1.6 - 8.3 10e9/L Final     Absolute Lymphocytes 03/06/2020 2.4  0.8 - 5.3 10e9/L Final     Absolute Monocytes 03/06/2020 0.5  0.0 - 1.3 10e9/L Final     Absolute Eosinophils 03/06/2020 0.1  0.0 - 0.7 10e9/L Final     Absolute Basophils 03/06/2020 0.0  0.0 - 0.2 10e9/L Final     Diff Method 03/06/2020 Automated Method   Final     AST 03/06/2020 24  0 - 45 U/L Final         Thank you for allowing me to be involved in your health care and for choosing [facility].  If you have any questions or concerns please feel free to contact me at [phone].      Sincerely,      Blanca Anand M.D.

## 2020-03-10 NOTE — PROGRESS NOTES
"[facility] - PRIMARY CARE SKIN    CC: recalcitrant acne, oral isotretinoin therapy  SUBJECTIVE:   Misbah Thompson is a(n) 20 year old male who presents to clinic today for follow-up of severe, recalcitrant acne.    Oral isotretinoin therapy monitoring    Months completed: 7.  Last month dosage: 40 mg BID.    Treatment response over the last month:  ***    Side effects noted:    Dryness: ***.    Arthralgias/myalgias: ***.    Mood changes: ***.    Other: He had some elevation of his AST from labs done on 2/26/2020. He admits that he drank alcohol. Repeat AST testing from 3/6/2020 was within normal limits.    PHQ-2 Score:   PHQ-2 ( 1999 Pfizer) 1/30/2020 9/5/2019   Q1: Little interest or pleasure in doing things 0 0   Q2: Feeling down, depressed or hopeless 0 0   PHQ-2 Score 0 0     PHQ-9 Score:  No flowsheet data found.    iPLEDGE #: [id number]    Refer to electronic medical record (EMR) for past medical history and medications.    INTEGUMENTARY/SKIN: *** for acne  ROS: 14 point review of systems was negative except the symptoms listed above in the HPI.    ***    OBJECTIVE:     Wt Readings from Last 2 Encounters:   12/06/19 130 lb (59 kg)   09/05/19 127 lb (57.6 kg)     GENERAL: {general exam abnormal carley:694516::\"healthy\",\"alert\",\"no distress\"}.  HEENT: PERRL. Conjunctiva, sclera clear.  SKIN: Lebron Skin Type - II.  Face, Neck and Trunk examined. The dermatoscope was used to help evaluate pigmented lesions.  Skin Pertinent Findings:  ***    Diagnostic Test Results:  Monitoring CBC, Lipid Profile, AST, (hCG if female).    ASSESSMENT:     Encounter Diagnoses   Name Primary?     Acne vulgaris Yes     Long-term current use of isotretinoin      Encounter for long-term (current) use of high-risk medication      Comment: severe acne, oral isotretinoin treatment with monthly monitoring.  MDM: ***Side effects of oral isotretinoin reviewed - dryness of the skin and mucous membranes, arthralgias, myalgias, " mood changes. Discussed potential flare of the acne.    PLAN:   There are no Patient Instructions on file for this visit.    When on oral isotretinoin, discontinue all other acne medications. Discussed the importance of sticking with the oral isotretinoin therapy program, not picking or excoriating.    Oral isotretinoin discussed fully with the patient.  Oral isotretinoin is a very effective drug to treat acne vulgaris but has many significant side effects. Chief among these are teratogenesis, hepatic injury, dyslipidemia and severe drying of the mucous membranes. All of these issues have been discussed in detail. Monthly blood tests to monitor lipids and liver functions will be necessary. Expect painful dryness and/or fissuring around the lips, eyes, and other moist areas of the body. Balms may be protective. Contact lenses may be too painful to wear temporarily while on this drug. Episodes of significant depression have been reported, including suicidal ideation and attempts in rare cases. It may also cause pseudotumor cerebri and hyperostosis. The patient will report any such changes in mood, depressive symptoms or suicidal thoughts, headaches, joint or bone pains.    Female patients MUST use two simultaneous methods of family planning. Accutane is Category X for pregnancy, meaning it will cause fetal teratogenic malformations, and pregnancy MUST be avoided while on this drug. For that reason, the patient is admonished to never share the medication.    The dose is 0.5-1 mg/kg in two divided doses for 15-20 weeks.    After discussion of these important issues, he indicates complete understanding of all of the above, and Does wish to proceed with accutane therapy.    Goal dosage: 8640 mg = 59 kg * 150 mg/kg.    Dose:  Month #: ***8.  Oral isotretinoin dosage: ***40 mg po BID.  Insurance preferred brand: N/A.  Plan for next month: ***.    Previous oral isotretinoin dosing:  Month #1 (prescribed on 8/5/2019): 20 mg  po BID x 30 days = 1200 mg.  Month #2 (prescribed on 9/5/2019): 30 mg po BID x 30 days = 1800 mg.  Month #3 (prescribed on 10/3/2019): 40 mg po BID x 30 days = 2400 mg.  Month #4 (prescribed on 11/04/2019): 40 mg po BID x 30 days = 2400 mg  Month #5 (prescribed on 12/02/2019): 40 mg po BID x 30 days = 2400 mg  Month #6 (prescribed on 01/30/2020): 30 mg po BID x 30 days =1800 mg  Month #7 (prescribed on 02/26/2020): 40 mg po BID x 30 days =2400 mg    Total dose to date: 20883 mg.    RTC in one month for follow up, or sooner prn, with laboratory studies completed.    TT: *** minutes.  CT: *** minutes.    ***

## 2020-03-30 ENCOUNTER — VIRTUAL VISIT (OUTPATIENT)
Dept: FAMILY MEDICINE | Facility: CLINIC | Age: 20
End: 2020-03-30
Payer: COMMERCIAL

## 2020-03-30 DIAGNOSIS — Z79.899 ENCOUNTER FOR LONG-TERM (CURRENT) USE OF HIGH-RISK MEDICATION: ICD-10-CM

## 2020-03-30 DIAGNOSIS — L70.0 ACNE VULGARIS: Primary | ICD-10-CM

## 2020-03-30 DIAGNOSIS — Z79.899 LONG-TERM CURRENT USE OF ISOTRETINOIN: ICD-10-CM

## 2020-03-30 PROCEDURE — 99442 ZZC PHYSICIAN TELEPHONE EVALUATION 11-20 MIN: CPT | Performed by: FAMILY MEDICINE

## 2020-03-30 RX ORDER — ISOTRETINOIN 40 MG/1
40 CAPSULE ORAL 2 TIMES DAILY
Qty: 60 CAPSULE | Refills: 0 | Status: SHIPPED | OUTPATIENT
Start: 2020-03-30 | End: 2020-04-27

## 2020-03-30 NOTE — PROGRESS NOTES
"Subjective     Misbah Thompson is a 20 year old male who is being evaluated via a billable telephone visit.      The patient has been notified of following:     \"This telephone visit will be conducted via a call between you and your physician/provider. We have found that certain health care needs can be provided without the need for a physical exam.  This service lets us provide the care you need with a short phone conversation.  If a prescription is necessary we can send it directly to your pharmacy.  If lab work is needed we can place an order for that and you can then stop by our lab to have the test done at a later time.    If during the course of the call the physician/provider feels a telephone visit is not appropriate, you will not be charged for this service.\"     Physician has received verbal consent for a Telephone Visit from the patient? Yes  Consent has been obtained for this service by 1 care team member: yes    Misbah Thompson complains of   Chief Complaint   Patient presents with     [facility] - PRIMARY CARE SKIN    CC: recalcitrant acne, oral isotretinoin therapy  SUBJECTIVE:   Misbah Thompson is a(n) 19 year old male who is on phone call visit ( because of the COVID-19 pandemic ) for follow up of oral isotretinoin treatment of his acne.        Oral isotretinoin therapy monitoring    Months completed: 7  ( Pt was not seen for month 6 due to unrelated illness. )  Last month dosage: 40 mg BID.    Treatment response over the last month: few breakout on the face and chest.        Side effects noted:    Dryness: YES but manageable.    Arthralgias/myalgias: No     Mood changes: NO.    Other: None. Had rash on arms that resolved.     PHQ-2 Score:   PHQ-2 ( 1999 Pfizer) 1/30/2020 9/5/2019   Q1: Little interest or pleasure in doing things 0 0   Q2: Feeling down, depressed or hopeless 0 0   PHQ-2 Score 0 0     iPLEDGE #: [id number]    Refer to electronic medical record " (EMR) for past medical history and medications.    INTEGUMENTARY/SKIN: POSITIVE for acne  ROS: 14 point review of systems was negative except the symptoms listed above in the HPI.      OBJECTIVE:     Wt Readings from Last 2 Encounters:   12/06/19 130 lb (59 kg)   09/05/19 127 lb (57.6 kg)     GENERAL: healthy, alert and no distress.  SKIN: Lebron Skin Type - II.  Face, Neck and Trunk examined. The dermatoscope was used to help evaluate pigmented lesions.  Skin Pertinent Findings:  Face: Residual scarring, moderate. Single papule  Chest: no inflammatory papules   Back and shoulders: Residual scarring, moderate.     Diagnostic Test Results:  Monitoring CBC, Lipid Profile, AST, (hCG if female).    ASSESSMENT:     Encounter Diagnoses   Name Primary?     Acne vulgaris Yes     Long-term current use of isotretinoin      Encounter for long-term (current) use of high-risk medication      Comment: severe acne, oral isotretinoin treatment with monthly monitoring.  MDM: Side effects of oral isotretinoin reviewed - dryness of the skin and mucous membranes, arthralgias, myalgias, mood changes. Discussed potential flare of the acne.    PLAN:   There are no Patient Instructions on file for this visit.    When on oral isotretinoin, discontinue all other acne medications. Discussed the importance of sticking with the oral isotretinoin therapy program, not picking or excoriating.    Oral isotretinoin discussed fully with the patient.  Oral isotretinoin is a very effective drug to treat acne vulgaris but has many significant side effects. Chief among these are teratogenesis, hepatic injury, dyslipidemia and severe drying of the mucous membranes. All of these issues have been discussed in detail. Monthly blood tests to monitor lipids and liver functions will be necessary. Expect painful dryness and/or fissuring around the lips, eyes, and other moist areas of the body. Balms may be protective. Contact lenses may be too painful to  wear temporarily while on this drug. Episodes of significant depression have been reported, including suicidal ideation and attempts in rare cases. It may also cause pseudotumor cerebri and hyperostosis. The patient will report any such changes in mood, depressive symptoms or suicidal thoughts, headaches, joint or bone pains.    Female patients MUST use two simultaneous methods of family planning. Accutane is Category X for pregnancy, meaning it will cause fetal teratogenic malformations, and pregnancy MUST be avoided while on this drug. For that reason, the patient is admonished to never share the medication.    The dose is 0.5-1 mg/kg in two divided doses for 15-20 weeks.    After discussion of these important issues, he indicates complete understanding of all of the above, and Does wish to proceed with accutane therapy.      Goal dosage: 8640 mg = Patient weight not available. * 150 mg/kg.    Dose:  Month #6.  Oral isotretinoin dosage: 40 mg bid    Previous oral isotretinoin dosing:  Month #1 (prescribed on 8/5/2019): 20 mg po BID * 30 days = 1200 mg.  Month #2 (prescribed on 9/5/2019): 30 mg po BID * 30 days = 1800 mg.  Month #3 (prescribed on 10/3/2019): 40 mg po BID * 30 days = 2400 mg.  Month #4 (prescribed on 11/04/2019): 40 mg po BID * 30 days = 2400 mg  Month #5 (prescribed on 12/02/2019): 40 mg po BID * 30 days = 2400 mg  Month #6 (prescribed on 01/30/2020): 30 mg po BID * 30 days =1800 mg  Month #7 (prescribed on 02/26/2020): 40mg po BID * 30 days =1800 mg  Total dose to date: 00829 mg    RTC in one month for follow up, or sooner prn, with laboratory studies completed.    TT: 20 minutes.  CT: 15 minutes.

## 2020-03-30 NOTE — LETTER
"    3/30/2020         RE: Misbah Thompson  [address] [facility] [address]        Dear Colleague,    Thank you for referring your patient, Misbah Thompson, to the [facility]. Please see a copy of my visit note below.    Subjective     Misbah Thompson is a 20 year old male who is being evaluated via a billable telephone visit.      The patient has been notified of following:     \"This telephone visit will be conducted via a call between you and your physician/provider. We have found that certain health care needs can be provided without the need for a physical exam.  This service lets us provide the care you need with a short phone conversation.  If a prescription is necessary we can send it directly to your pharmacy.  If lab work is needed we can place an order for that and you can then stop by our lab to have the test done at a later time.    If during the course of the call the physician/provider feels a telephone visit is not appropriate, you will not be charged for this service.\"     Physician has received verbal consent for a Telephone Visit from the patient? Yes    Misbah Thompson complains of   Chief Complaint   Patient presents with     [facility] - PRIMARY CARE SKIN    CC: recalcitrant acne, oral isotretinoin therapy  SUBJECTIVE:   Misbah Thompson is a(n) 19 year old male who is on phone call visit ( because of the COVID-19 pandemic ) for follow up of oral isotretinoin treatment of his acne.        Oral isotretinoin therapy monitoring    Months completed: 7  ( Pt was not seen for month 6 due to unrelated illness. )  Last month dosage: 40 mg BID.    Treatment response over the last month: few breakout on the face and chest.        Side effects noted:    Dryness: YES but manageable.    Arthralgias/myalgias: No     Mood changes: NO.    Other: None. Had rash on arms that resolved.     PHQ-2 Score:   PHQ-2 ( 1999 Pfizer) 1/30/2020 " 9/5/2019   Q1: Little interest or pleasure in doing things 0 0   Q2: Feeling down, depressed or hopeless 0 0   PHQ-2 Score 0 0     iPLEDGE #: [id number]    Refer to electronic medical record (EMR) for past medical history and medications.    INTEGUMENTARY/SKIN: POSITIVE for acne  ROS: 14 point review of systems was negative except the symptoms listed above in the HPI.      OBJECTIVE:     Wt Readings from Last 2 Encounters:   12/06/19 130 lb (59 kg)   09/05/19 127 lb (57.6 kg)     GENERAL: healthy, alert and no distress.  SKIN: Lebron Skin Type - II.  Face, Neck and Trunk examined. The dermatoscope was used to help evaluate pigmented lesions.  Skin Pertinent Findings:  Face: Residual scarring, moderate. Single papule  Chest: no inflammatory papules   Back and shoulders: Residual scarring, moderate.     Diagnostic Test Results:  Monitoring CBC, Lipid Profile, AST, (hCG if female).    ASSESSMENT:     Encounter Diagnoses   Name Primary?     Acne vulgaris Yes     Long-term current use of isotretinoin      Encounter for long-term (current) use of high-risk medication      Comment: severe acne, oral isotretinoin treatment with monthly monitoring.  MDM: Side effects of oral isotretinoin reviewed - dryness of the skin and mucous membranes, arthralgias, myalgias, mood changes. Discussed potential flare of the acne.    PLAN:   There are no Patient Instructions on file for this visit.    When on oral isotretinoin, discontinue all other acne medications. Discussed the importance of sticking with the oral isotretinoin therapy program, not picking or excoriating.    Oral isotretinoin discussed fully with the patient.  Oral isotretinoin is a very effective drug to treat acne vulgaris but has many significant side effects. Chief among these are teratogenesis, hepatic injury, dyslipidemia and severe drying of the mucous membranes. All of these issues have been discussed in detail. Monthly blood tests to monitor lipids and liver  functions will be necessary. Expect painful dryness and/or fissuring around the lips, eyes, and other moist areas of the body. Balms may be protective. Contact lenses may be too painful to wear temporarily while on this drug. Episodes of significant depression have been reported, including suicidal ideation and attempts in rare cases. It may also cause pseudotumor cerebri and hyperostosis. The patient will report any such changes in mood, depressive symptoms or suicidal thoughts, headaches, joint or bone pains.    Female patients MUST use two simultaneous methods of family planning. Accutane is Category X for pregnancy, meaning it will cause fetal teratogenic malformations, and pregnancy MUST be avoided while on this drug. For that reason, the patient is admonished to never share the medication.    The dose is 0.5-1 mg/kg in two divided doses for 15-20 weeks.    After discussion of these important issues, he indicates complete understanding of all of the above, and Does wish to proceed with accutane therapy.      Goal dosage: 8640 mg = Patient weight not available. * 150 mg/kg.    Dose:  Month #6.  Oral isotretinoin dosage: 40 mg bid    Previous oral isotretinoin dosing:  Month #1 (prescribed on 8/5/2019): 20 mg po BID * 30 days = 1200 mg.  Month #2 (prescribed on 9/5/2019): 30 mg po BID * 30 days = 1800 mg.  Month #3 (prescribed on 10/3/2019): 40 mg po BID * 30 days = 2400 mg.  Month #4 (prescribed on 11/04/2019): 40 mg po BID * 30 days = 2400 mg  Month #5 (prescribed on 12/02/2019): 40 mg po BID * 30 days = 2400 mg  Month #6 (prescribed on 01/30/2020): 30 mg po BID * 30 days =1800 mg  Month #7 (prescribed on 02/26/2020): 40mg po BID * 30 days =1800 mg  Total dose to date: 13975 mg    RTC in one month for follow up, or sooner prn, with laboratory studies completed.    TT: 20 minutes.  CT: 15 minutes.      Again, thank you for allowing me to participate in the care of your patient.        Sincerely,        Blanca HER  MD Cheng

## 2020-04-27 ENCOUNTER — VIRTUAL VISIT (OUTPATIENT)
Dept: FAMILY MEDICINE | Facility: CLINIC | Age: 20
End: 2020-04-27
Payer: COMMERCIAL

## 2020-04-27 DIAGNOSIS — L70.0 ACNE VULGARIS: ICD-10-CM

## 2020-04-27 PROCEDURE — 99213 OFFICE O/P EST LOW 20 MIN: CPT | Mod: 95 | Performed by: FAMILY MEDICINE

## 2020-04-27 RX ORDER — ISOTRETINOIN 40 MG/1
40 CAPSULE ORAL 2 TIMES DAILY
Qty: 60 CAPSULE | Refills: 0 | Status: SHIPPED | OUTPATIENT
Start: 2020-04-27 | End: 2020-05-26

## 2020-04-27 NOTE — PATIENT INSTRUCTIONS
Isotretinoin 40 mg one cap two times per day  Recheck in 4 weeks  Lab work within the next week  Plan : last month of oral isotretinoin

## 2020-04-27 NOTE — PROGRESS NOTES
"    Misbah Thompson is a 20 year old male who is being evaluated via a billable video visit.      The patient has been notified of following:     \"This video visit will be conducted via a call between you and your physician/provider. We have found that certain health care needs can be provided without the need for an in-person physical exam.  This service lets us provide the care you need with a video conversation.  If a prescription is necessary we can send it directly to your pharmacy.  If lab work is needed we can place an order for that and you can then stop by our lab to have the test done at a later time.    Video visits are billed at different rates depending on your insurance coverage.  Please reach out to your insurance provider with any questions.    If during the course of the call the physician/provider feels a video visit is not appropriate, you will not be charged for this service.\"    Patient has given verbal consent for Video visit? Yes    How would you like to obtain your AVS? Ephraim    Patient would like the video invitation sent by: Text to cell phone: [phone]        [facility] - PRIMARY CARE SKIN    CC: recalcitrant acne, oral isotretinoin therapy  SUBJECTIVE:   Misbah Thompson is a(n) 19 year old male who is on phone call visit ( because of the COVID-19 pandemic ) for follow up of oral isotretinoin treatment of his acne.      Months completed: 8  ( Pt was not seen for month 6 due to unrelated illness. )  Last month dosage: 40 mg BID.    Treatment response over the last month: couple of inflammatory papules along the side of mask      Side effects noted:    Dryness: YES but manageable. Some burning on the lips when he is eating.    Arthralgias/myalgias: No     Mood changes: NO.    Other: None. Had rash on arms that resolved.     PHQ-2 Score:   PHQ-2 ( 1999 Pfizer) 1/30/2020 9/5/2019   Q1: Little interest or pleasure in doing things 0 0   Q2: Feeling down, depressed or " hopeless 0 0   PHQ-2 Score 0 0     iPLEDGE #: [id number]    Refer to electronic medical record (EMR) for past medical history and medications.    INTEGUMENTARY/SKIN: POSITIVE for acne  ROS: 14 point review of systems was negative except the symptoms listed above in the HPI.      OBJECTIVE:     Wt Readings from Last 2 Encounters:   12/06/19 130 lb (59 kg)   09/05/19 127 lb (57.6 kg)     GENERAL: healthy, alert and no distress.  SKIN: Lebron Skin Type - II.  Face, Neck and Trunk examined. The dermatoscope was used to help evaluate pigmented lesions.  Skin Pertinent Findings:  Digital photos : reviewed    Diagnostic Test Results:  Monitoring CBC, Lipid Profile, AST, (hCG if female).    ASSESSMENT:     Encounter Diagnosis   Name Primary?     Acne vulgaris      Comment: severe acne, oral isotretinoin treatment with monthly monitoring.  MDM: Side effects of oral isotretinoin reviewed - dryness of the skin and mucous membranes, arthralgias, myalgias, mood changes. Discussed potential flare of the acne.    PLAN:   Patient Instructions   Isotretinoin 40 mg one cap two times per day  Recheck in 4 weeks  Lab work within the next week  Plan : last month of oral isotretinoin       When on oral isotretinoin, discontinue all other acne medications. Discussed the importance of sticking with the oral isotretinoin therapy program, not picking or excoriating.    Oral isotretinoin discussed fully with the patient.  Oral isotretinoin is a very effective drug to treat acne vulgaris but has many significant side effects. Chief among these are teratogenesis, hepatic injury, dyslipidemia and severe drying of the mucous membranes. All of these issues have been discussed in detail. Monthly blood tests to monitor lipids and liver functions will be necessary. Expect painful dryness and/or fissuring around the lips, eyes, and other moist areas of the body. Balms may be protective. Contact lenses may be too painful to wear temporarily while  on this drug. Episodes of significant depression have been reported, including suicidal ideation and attempts in rare cases. It may also cause pseudotumor cerebri and hyperostosis. The patient will report any such changes in mood, depressive symptoms or suicidal thoughts, headaches, joint or bone pains.    Female patients MUST use two simultaneous methods of family planning. Accutane is Category X for pregnancy, meaning it will cause fetal teratogenic malformations, and pregnancy MUST be avoided while on this drug. For that reason, the patient is admonished to never share the medication.    The dose is 0.5-1 mg/kg in two divided doses for 15-20 weeks.    After discussion of these important issues, he indicates complete understanding of all of the above, and Does wish to proceed with accutane therapy.      Goal dosage: 8640 mg = Patient weight not available. * 150 mg/kg.    Dose:  Month #7  Oral isotretinoin dosage: 40 mg bid    Previous oral isotretinoin dosing:  Month #1 (prescribed on 8/5/2019): 20 mg po BID * 30 days = 1200 mg.  Month #2 (prescribed on 9/5/2019): 30 mg po BID * 30 days = 1800 mg.  Month #3 (prescribed on 10/3/2019): 40 mg po BID * 30 days = 2400 mg.  Month #4 (prescribed on 11/04/2019): 40 mg po BID * 30 days = 2400 mg  Month #5 (prescribed on 12/02/2019): 40 mg po BID * 30 days = 2400 mg  Month #6 (prescribed on 01/30/2020): 30 mg po BID * 30 days =1800 mg  Month #7 (prescribed on 02/26/2020): 40mg po BID * 30 days =1800 mg  Month #9 (prescribed on 03/26/2020): 40mg po BID * 30 days =1800 mg  Total dose to date: [id number]    RTC in one month for follow up, or sooner prn, with laboratory studies completed.    TT: 20 minutes.  CT: 15 minutes

## 2020-05-26 ENCOUNTER — VIRTUAL VISIT (OUTPATIENT)
Dept: FAMILY MEDICINE | Facility: CLINIC | Age: 20
End: 2020-05-26
Payer: COMMERCIAL

## 2020-05-26 DIAGNOSIS — L70.0 ACNE VULGARIS: ICD-10-CM

## 2020-05-26 DIAGNOSIS — Z79.899 ENCOUNTER FOR LONG-TERM (CURRENT) USE OF MEDICATIONS: Primary | ICD-10-CM

## 2020-05-26 PROCEDURE — 99213 OFFICE O/P EST LOW 20 MIN: CPT | Mod: 95 | Performed by: FAMILY MEDICINE

## 2020-05-26 RX ORDER — ISOTRETINOIN 40 MG/1
40 CAPSULE ORAL 2 TIMES DAILY
Qty: 60 CAPSULE | Refills: 0 | Status: SHIPPED | OUTPATIENT
Start: 2020-05-26 | End: 2020-06-25

## 2020-05-26 NOTE — LETTER
"    5/26/2020         RE: Misbah Thompson  [address] [facility] [address]  [place] [address]        Dear Colleague,    Thank you for referring your patient, Misbah Thompson, to the [facility]. Please see a copy of my visit note below.            Misbah Thompson is a 20 year old male who is being evaluated via a billable video visit.      The patient has been notified of following:     \"This video visit will be conducted via a call between you and your physician/provider. We have found that certain health care needs can be provided without the need for an in-person physical exam.  This service lets us provide the care you need with a video conversation.  If a prescription is necessary we can send it directly to your pharmacy.  If lab work is needed we can place an order for that and you can then stop by our lab to have the test done at a later time.    Video visits are billed at different rates depending on your insurance coverage.  Please reach out to your insurance provider with any questions.    If during the course of the call the physician/provider feels a video visit is not appropriate, you will not be charged for this service.\"    Patient has given verbal consent for Video visit? Yes    How would you like to obtain your AVS? MyChart    Patient would like the video invitation sent by: Text to cell phone: [phone]         [facility] - PRIMARY CARE SKIN      CC: recalcitrant acne, oral isotretinoin therapy  SUBJECTIVE:   Misbah Thompson is a(n) 19 year old male who is on virtual visit ( because of the COVID-19 pandemic ) for follow up of oral isotretinoin treatment of his acne.      Months completed:9  ( Pt was not seen for month 6 due to unrelated illness. )  Last month dosage: 40 mg BID.    Treatment response over the last month: few breakout on the side of the nose, probably related to mask. Also noticed facial skin is not as dry.     Side effects noted:    Dryness: YES " but manageable. Some burning on the lips when he is eating.    Arthralgias/myalgias: No     Mood changes: NO.    Other: None. Had rash on arms that resolved.     PHQ-2 Score:   PHQ-2 ( 1999 Pfizer) 1/30/2020 9/5/2019   Q1: Little interest or pleasure in doing things 0 0   Q2: Feeling down, depressed or hopeless 0 0   PHQ-2 Score 0 0     iPLEDGE #: [id number]    Refer to electronic medical record (EMR) for past medical history and medications.    INTEGUMENTARY/SKIN: POSITIVE for acne  ROS: 14 point review of systems was negative except the symptoms listed above in the HPI.      OBJECTIVE:     Wt Readings from Last 2 Encounters:   12/06/19 130 lb (59 kg)   09/05/19 127 lb (57.6 kg)     GENERAL: healthy, alert and no distress.  SKIN: Lebron Skin Type - II.  Face, Neck and Trunk examined.  Skin Pertinent Findings:  Digital photos : reviewed    Diagnostic Test Results:  Monitoring CBC, Lipid Profile, AST, (hCG if female).    ASSESSMENT:     Encounter Diagnoses   Name Primary?     Acne vulgaris      Encounter for long-term (current) use of medications Yes     Comment: severe acne, oral isotretinoin treatment with monthly monitoring.  MDM: Side effects of oral isotretinoin reviewed - dryness of the skin and mucous membranes, arthralgias, myalgias, mood changes. Discussed potential flare of the acne.    PLAN:   Patient Instructions   Isotretinoin 40 mg one tab two times per day  Recheck in 4 weeks  Lab work this week      When on oral isotretinoin, discontinue all other acne medications. Discussed the importance of sticking with the oral isotretinoin therapy program, not picking or excoriating.    Oral isotretinoin discussed fully with the patient.  Oral isotretinoin is a very effective drug to treat acne vulgaris but has many significant side effects. Chief among these are teratogenesis, hepatic injury, dyslipidemia and severe drying of the mucous membranes. All of these issues have been discussed in detail. Monthly  blood tests to monitor lipids and liver functions will be necessary. Expect painful dryness and/or fissuring around the lips, eyes, and other moist areas of the body. Balms may be protective. Contact lenses may be too painful to wear temporarily while on this drug. Episodes of significant depression have been reported, including suicidal ideation and attempts in rare cases. It may also cause pseudotumor cerebri and hyperostosis. The patient will report any such changes in mood, depressive symptoms or suicidal thoughts, headaches, joint or bone pains.    Female patients MUST use two simultaneous methods of family planning. Accutane is Category X for pregnancy, meaning it will cause fetal teratogenic malformations, and pregnancy MUST be avoided while on this drug. For that reason, the patient is admonished to never share the medication.    The dose is 0.5-1 mg/kg in two divided doses for 15-20 weeks.    After discussion of these important issues, he indicates complete understanding of all of the above, and Does wish to proceed with accutane therapy.      Goal dosage: 8640 mg = Patient weight not available. * 150 mg/kg.    Dose:  Month #7  Oral isotretinoin dosage: 40 mg bid    Previous oral isotretinoin dosing:  Month #1 (prescribed on 8/5/2019): 20 mg po BID * 30 days = 1200 mg.  Month #2 (prescribed on 9/5/2019): 30 mg po BID * 30 days = 1800 mg.  Month #3 (prescribed on 10/3/2019): 40 mg po BID * 30 days = 2400 mg.  Month #4 (prescribed on 11/04/2019): 40 mg po BID * 30 days = 2400 mg  Month #5 (prescribed on 12/02/2019): 40 mg po BID * 30 days = 2400 mg  Month #6 (prescribed on 01/30/2020): 30 mg po BID * 30 days =1800 mg  Month #7 (prescribed on 02/26/2020): 40mg po BID * 30 days =1800 mg  Month #9 (prescribed on 03/26/2020): 40mg po BID * 30 days =1800 mg  Total dose to date: [id number]    RTC in one month for follow up, or sooner prn, with laboratory studies completed.    TT: 20 minutes.  CT: 15  minutes          Again, thank you for allowing me to participate in the care of your patient.        Sincerely,        Blanca Anand MD

## 2020-05-26 NOTE — PROGRESS NOTES
"        Misbah Thompson is a 20 year old male who is being evaluated via a billable video visit.      The patient has been notified of following:     \"This video visit will be conducted via a call between you and your physician/provider. We have found that certain health care needs can be provided without the need for an in-person physical exam.  This service lets us provide the care you need with a video conversation.  If a prescription is necessary we can send it directly to your pharmacy.  If lab work is needed we can place an order for that and you can then stop by our lab to have the test done at a later time.    Video visits are billed at different rates depending on your insurance coverage.  Please reach out to your insurance provider with any questions.    If during the course of the call the physician/provider feels a video visit is not appropriate, you will not be charged for this service.\"    Patient has given verbal consent for Video visit? Yes    How would you like to obtain your AVS? Ephraim    Patient would like the video invitation sent by: Text to cell phone: [phone]         [facility] - PRIMARY CARE SKIN      CC: recalcitrant acne, oral isotretinoin therapy  SUBJECTIVE:   Misbah Thompson is a(n) 19 year old male who is on virtual visit ( because of the COVID-19 pandemic ) for follow up of oral isotretinoin treatment of his acne.      Months completed:9  ( Pt was not seen for month 6 due to unrelated illness. )  Last month dosage: 40 mg BID.    Treatment response over the last month: few breakout on the side of the nose, probably related to mask. Also noticed facial skin is not as dry.     Side effects noted:    Dryness: YES but manageable. Some burning on the lips when he is eating.    Arthralgias/myalgias: No     Mood changes: NO.    Other: None. Had rash on arms that resolved.     PHQ-2 Score:   PHQ-2 ( 1999 Pfizer) 1/30/2020 9/5/2019   Q1: Little interest or pleasure in doing " things 0 0   Q2: Feeling down, depressed or hopeless 0 0   PHQ-2 Score 0 0     iPLEDGE #: [id number]    Refer to electronic medical record (EMR) for past medical history and medications.    INTEGUMENTARY/SKIN: POSITIVE for acne  ROS: 14 point review of systems was negative except the symptoms listed above in the HPI.      OBJECTIVE:     Wt Readings from Last 2 Encounters:   12/06/19 130 lb (59 kg)   09/05/19 127 lb (57.6 kg)     GENERAL: healthy, alert and no distress.  SKIN: Lebron Skin Type - II.  Face, Neck and Trunk examined.  Skin Pertinent Findings:  Digital photos : reviewed    Diagnostic Test Results:  Monitoring CBC, Lipid Profile, AST, (hCG if female).    ASSESSMENT:     Encounter Diagnoses   Name Primary?     Acne vulgaris      Encounter for long-term (current) use of medications Yes     Comment: severe acne, oral isotretinoin treatment with monthly monitoring.  MDM: Side effects of oral isotretinoin reviewed - dryness of the skin and mucous membranes, arthralgias, myalgias, mood changes. Discussed potential flare of the acne.    PLAN:   Patient Instructions   Isotretinoin 40 mg one tab two times per day  Recheck in 4 weeks  Lab work this week      When on oral isotretinoin, discontinue all other acne medications. Discussed the importance of sticking with the oral isotretinoin therapy program, not picking or excoriating.    Oral isotretinoin discussed fully with the patient.  Oral isotretinoin is a very effective drug to treat acne vulgaris but has many significant side effects. Chief among these are teratogenesis, hepatic injury, dyslipidemia and severe drying of the mucous membranes. All of these issues have been discussed in detail. Monthly blood tests to monitor lipids and liver functions will be necessary. Expect painful dryness and/or fissuring around the lips, eyes, and other moist areas of the body. Balms may be protective. Contact lenses may be too painful to wear temporarily while on this  drug. Episodes of significant depression have been reported, including suicidal ideation and attempts in rare cases. It may also cause pseudotumor cerebri and hyperostosis. The patient will report any such changes in mood, depressive symptoms or suicidal thoughts, headaches, joint or bone pains.    Female patients MUST use two simultaneous methods of family planning. Accutane is Category X for pregnancy, meaning it will cause fetal teratogenic malformations, and pregnancy MUST be avoided while on this drug. For that reason, the patient is admonished to never share the medication.    The dose is 0.5-1 mg/kg in two divided doses for 15-20 weeks.    After discussion of these important issues, he indicates complete understanding of all of the above, and Does wish to proceed with accutane therapy.      Goal dosage: 8640 mg = Patient weight not available. * 150 mg/kg.    Dose:  Month #7  Oral isotretinoin dosage: 40 mg bid    Previous oral isotretinoin dosing:  Month #1 (prescribed on 8/5/2019): 20 mg po BID * 30 days = 1200 mg.  Month #2 (prescribed on 9/5/2019): 30 mg po BID * 30 days = 1800 mg.  Month #3 (prescribed on 10/3/2019): 40 mg po BID * 30 days = 2400 mg.  Month #4 (prescribed on 11/04/2019): 40 mg po BID * 30 days = 2400 mg  Month #5 (prescribed on 12/02/2019): 40 mg po BID * 30 days = 2400 mg  Month #6 (prescribed on 01/30/2020): 30 mg po BID * 30 days =1800 mg  Month #7 (prescribed on 02/26/2020): 40mg po BID * 30 days =1800 mg  Month #9 (prescribed on 03/26/2020): 40mg po BID * 30 days =1800 mg  Total dose to date: [id number]    RTC in one month for follow up, or sooner prn, with laboratory studies completed.    TT: 20 minutes.  CT: 15 minutes

## 2020-06-16 DIAGNOSIS — L70.0 ACNE VULGARIS: ICD-10-CM

## 2020-06-16 RX ORDER — ISOTRETINOIN 40 MG/1
40 CAPSULE ORAL 2 TIMES DAILY
Qty: 60 CAPSULE | Refills: 0 | Status: CANCELLED | OUTPATIENT
Start: 2020-06-16

## 2020-06-16 NOTE — TELEPHONE ENCOUNTER
Reason for Call:  Medication or medication refill:    Do you use a [facility]?  Name of the pharmacy and phone number for the current request:   [id number] [id number] AT [place] [address]     Name of the medication requested: ISOtretinoin (ACCUTANE) 40 MG capsule    Other request: Misbah is calling saying he is completely out of his Accutane and needs it today.     Can we leave a detailed message on this number? YES    Phone number patient can be reached at: Cell number on file:    Telephone Information:   Mobile [phone]     Best Time: Anytime    Call taken on 6/16/2020 at 1:07 PM by Zee Arriaza       [Acting Fussy] : not acting ~L fussy [Wgt Loss (___ Lbs)] : no recent weight loss [Fever] : no fever [Pallor] : not pale [Eye Discharge] : no eye discharge [Redness] : no redness [Nasal Discharge] : no nasal discharge [Nasal Stuffiness] : no nasal congestion [Sore Throat] : no sore throat [Earache] : no earache [Cyanosis] : no cyanosis [Diaphoresis] : not diaphoretic [Edema] : no edema [Fast HR] : no tachycardia [Exercise Intolerance] : no persistence of exercise intolerance [Wheezing] : no wheezing [Tachypnea] : not tachypneic [Vomiting] : no vomiting [Cough] : no cough [Being A Poor Eater] : not a poor eater [Decrease In Appetite] : appetite not decreased [Diarrhea] : no diarrhea [Abdominal Pain] : no abdominal pain [Fainting (Syncope)] : no fainting [Seizure] : no seizures [Hypotonicity (Flaccid)] : not hypotonic [Limping] : no limping [Joint Pains] : no arthralgias [Joint Swelling] : no joint swelling [Wound problems] : no wound problems [Rash] : no rash [Bruising] : no tendency for easy bruising [Swollen Glands] : no lymphadenopathy [Nosebleeds] : no epistaxis [Sleep Disturbances] : ~T no sleep disturbances [Hyperactive] : no hyperactive behavior [Short Stature] : short stature was not noted [Dec Urine Output] : no oliguria [Failure To Thrive] : no failure to thrive

## 2020-06-16 NOTE — TELEPHONE ENCOUNTER
This was sent on May 26 th, 2020- per Ipledge patient may receive.  Left voice mail that he should have picked this up the week of may 26 th-   Patient needs to call the pharmacy and inquire on prescription.  I called the pharmacist and he states it was too soon to refill at that time (last refill was 4/27/20)   They state it went through now and they will get it ready for him  Yamilet COATES RN BSN  [facility]  [phone]

## 2020-06-24 NOTE — PROGRESS NOTES
"      [facility] - PRIMARY CARE SKIN      Misbah Thompson is a 20 year old male who is being evaluated via a billable video visit.      The patient has been notified of following:     \"This video visit will be conducted via a call between you and your physician/provider. We have found that certain health care needs can be provided without the need for an in-person physical exam.  This service lets us provide the care you need with a video conversation.  If a prescription is necessary we can send it directly to your pharmacy.  If lab work is needed we can place an order for that and you can then stop by our lab to have the test done at a later time.    Video visits are billed at different rates depending on your insurance coverage.  Please reach out to your insurance provider with any questions.    If during the course of the call the physician/provider feels a video visit is not appropriate, you will not be charged for this service.\"    Patient has given verbal consent for Video visit? Yes    How would you like to obtain your AVS? Mail a copy    Patient would like the video invitation sent by: Text to cell phone: [phone]        CC: recalcitrant acne, oral isotretinoin therapy  SUBJECTIVE:   Misbah Thompson is a(n) 19 year old male who is on virtual visit ( because of the COVID-19 pandemic ) for follow up of oral isotretinoin treatment of his acne.      Months completed:10  ( Pt was not seen for month 6 due to unrelated illness. )  Last month dosage: 40 mg BID.    Treatment response over the last month:  Couple of stress breakouts    Side effects noted:    Dryness: YES but manageable. Some burning on the lips when he is eating.    Arthralgias/myalgias: No     Mood changes: NO.    Other: None. Had rash on arms that resolved.     PHQ-2 Score:   PHQ-2 ( 1999 Pfizer) 5/26/2020 1/30/2020   Q1: Little interest or pleasure in doing things 0 0   Q2: Feeling down, depressed or hopeless 0 0   PHQ-2 Score 0 " 0     iPLEDGE #: [id number]    Refer to electronic medical record (EMR) for past medical history and medications.    INTEGUMENTARY/SKIN: POSITIVE for acne  ROS: 14 point review of systems was negative except the symptoms listed above in the HPI.      OBJECTIVE:     Wt Readings from Last 2 Encounters:   12/06/19 130 lb (59 kg)   09/05/19 127 lb (57.6 kg)     GENERAL: healthy, alert and no distress.  SKIN: Lebron Skin Type - II.  Face, Neck and Trunk examined.  Skin Pertinent Findings:  Digital photos : reviewed    Diagnostic Test Results:  Monitoring CBC, Lipid Profile, AST, (hCG if female).    ASSESSMENT:     Encounter Diagnoses   Name Primary?     Acne vulgaris      Long-term current use of isotretinoin Yes     Comment: severe acne, oral isotretinoin treatment with monthly monitoring.  MDM: Side effects of oral isotretinoin reviewed - dryness of the skin and mucous membranes, arthralgias, myalgias, mood changes. Discussed potential flare of the acne.    PLAN:   Patient Instructions   Isotretinoin 40 mg one cap two times per day, anticipate this may be last month  Stressed needs to get his lab work this week.      When on oral isotretinoin, discontinue all other acne medications. Discussed the importance of sticking with the oral isotretinoin therapy program, not picking or excoriating.    Oral isotretinoin discussed fully with the patient.  Oral isotretinoin is a very effective drug to treat acne vulgaris but has many significant side effects. Chief among these are teratogenesis, hepatic injury, dyslipidemia and severe drying of the mucous membranes. All of these issues have been discussed in detail. Monthly blood tests to monitor lipids and liver functions will be necessary. Expect painful dryness and/or fissuring around the lips, eyes, and other moist areas of the body. Balms may be protective. Contact lenses may be too painful to wear temporarily while on this drug. Episodes of significant depression have  been reported, including suicidal ideation and attempts in rare cases. It may also cause pseudotumor cerebri and hyperostosis. The patient will report any such changes in mood, depressive symptoms or suicidal thoughts, headaches, joint or bone pains.    Female patients MUST use two simultaneous methods of family planning. Accutane is Category X for pregnancy, meaning it will cause fetal teratogenic malformations, and pregnancy MUST be avoided while on this drug. For that reason, the patient is admonished to never share the medication.    The dose is 0.5-1 mg/kg in two divided doses for 15-20 weeks.    After discussion of these important issues, he indicates complete understanding of all of the above, and Does wish to proceed with accutane therapy.      Goal dosage: 8640 mg = Patient weight not available. * 150 mg/kg.    Dose:  Month #10  Oral isotretinoin dosage: 40 mg bid ? Last month    Previous oral isotretinoin dosing:  Month #1 (prescribed on 8/5/2019): 20 mg po BID * 30 days = 1200 mg.  Month #2 (prescribed on 9/5/2019): 30 mg po BID * 30 days = 1800 mg.  Month #3 (prescribed on 10/3/2019): 40 mg po BID * 30 days = 2400 mg.  Month #4 (prescribed on 11/04/2019): 40 mg po BID * 30 days = 2400 mg  Month #5 (prescribed on 12/02/2019): 40 mg po BID * 30 days = 2400 mg  Month #6 (prescribed on 01/30/2020): 30 mg po BID * 30 days =2400 mg  Month #7 (prescribed on 02/26/2020): 40mg po BID * 30 days =2400 mg  Month #9 (prescribed on 03/26/2020): 40mg po BID * 30 days =2400 mg  Month #10 (prescribed on 04/26/2020): 40mg po BID * 30 days =2400 mg  Total dose to date: 19,800 mg    RTC in one month for follow up, or sooner prn, with laboratory studies completed.    TT: 20 minutes.  CT: 15 minutes

## 2020-06-25 ENCOUNTER — VIRTUAL VISIT (OUTPATIENT)
Dept: FAMILY MEDICINE | Facility: CLINIC | Age: 20
End: 2020-06-25
Payer: COMMERCIAL

## 2020-06-25 DIAGNOSIS — L70.0 ACNE VULGARIS: ICD-10-CM

## 2020-06-25 DIAGNOSIS — Z79.899 LONG-TERM CURRENT USE OF ISOTRETINOIN: Primary | ICD-10-CM

## 2020-06-25 PROCEDURE — 99213 OFFICE O/P EST LOW 20 MIN: CPT | Mod: 95 | Performed by: FAMILY MEDICINE

## 2020-06-25 RX ORDER — ISOTRETINOIN 40 MG/1
40 CAPSULE ORAL 2 TIMES DAILY
Qty: 60 CAPSULE | Refills: 0 | Status: SHIPPED | OUTPATIENT
Start: 2020-06-25

## 2020-06-25 NOTE — LETTER
"    6/25/2020         RE: Misbah Thompson  [id number] [address] [facility] [address] [facility] [address]  [place] [address]        Dear Colleague,    Thank you for referring your patient, Misbah Thompson, to the [facility]. Please see a copy of my visit note below.          [facility] - PRIMARY CARE SKIN      Misbah Thompson is a 20 year old male who is being evaluated via a billable video visit.      The patient has been notified of following:     \"This video visit will be conducted via a call between you and your physician/provider. We have found that certain health care needs can be provided without the need for an in-person physical exam.  This service lets us provide the care you need with a video conversation.  If a prescription is necessary we can send it directly to your pharmacy.  If lab work is needed we can place an order for that and you can then stop by our lab to have the test done at a later time.    Video visits are billed at different rates depending on your insurance coverage.  Please reach out to your insurance provider with any questions.    If during the course of the call the physician/provider feels a video visit is not appropriate, you will not be charged for this service.\"    Patient has given verbal consent for Video visit? Yes    How would you like to obtain your AVS? Mail a copy    Patient would like the video invitation sent by: Text to cell phone: [phone]        CC: recalcitrant acne, oral isotretinoin therapy  SUBJECTIVE:   Misbah Thompson is a(n) 19 year old male who is on virtual visit ( because of the COVID-19 pandemic ) for follow up of oral isotretinoin treatment of his acne.      Months completed:10  ( Pt was not seen for month 6 due to unrelated illness. )  Last month dosage: 40 mg BID.    Treatment response over the last month:  Couple of stress breakouts    Side effects noted:    Dryness: YES but manageable. Some burning on the lips when he is " eating.    Arthralgias/myalgias: No     Mood changes: NO.    Other: None. Had rash on arms that resolved.     PHQ-2 Score:   PHQ-2 ( 1999 Pfizer) 5/26/2020 1/30/2020   Q1: Little interest or pleasure in doing things 0 0   Q2: Feeling down, depressed or hopeless 0 0   PHQ-2 Score 0 0     iPLEDGE #: [id number]    Refer to electronic medical record (EMR) for past medical history and medications.    INTEGUMENTARY/SKIN: POSITIVE for acne  ROS: 14 point review of systems was negative except the symptoms listed above in the HPI.      OBJECTIVE:     Wt Readings from Last 2 Encounters:   12/06/19 130 lb (59 kg)   09/05/19 127 lb (57.6 kg)     GENERAL: healthy, alert and no distress.  SKIN: Lebron Skin Type - II.  Face, Neck and Trunk examined.  Skin Pertinent Findings:  Digital photos : reviewed    Diagnostic Test Results:  Monitoring CBC, Lipid Profile, AST, (hCG if female).    ASSESSMENT:     Encounter Diagnoses   Name Primary?     Acne vulgaris      Long-term current use of isotretinoin Yes     Comment: severe acne, oral isotretinoin treatment with monthly monitoring.  MDM: Side effects of oral isotretinoin reviewed - dryness of the skin and mucous membranes, arthralgias, myalgias, mood changes. Discussed potential flare of the acne.    PLAN:   Patient Instructions   Isotretinoin 40 mg one cap two times per day, anticipate this may be last month  Stressed needs to get his lab work this week.      When on oral isotretinoin, discontinue all other acne medications. Discussed the importance of sticking with the oral isotretinoin therapy program, not picking or excoriating.    Oral isotretinoin discussed fully with the patient.  Oral isotretinoin is a very effective drug to treat acne vulgaris but has many significant side effects. Chief among these are teratogenesis, hepatic injury, dyslipidemia and severe drying of the mucous membranes. All of these issues have been discussed in detail. Monthly blood tests to monitor  lipids and liver functions will be necessary. Expect painful dryness and/or fissuring around the lips, eyes, and other moist areas of the body. Balms may be protective. Contact lenses may be too painful to wear temporarily while on this drug. Episodes of significant depression have been reported, including suicidal ideation and attempts in rare cases. It may also cause pseudotumor cerebri and hyperostosis. The patient will report any such changes in mood, depressive symptoms or suicidal thoughts, headaches, joint or bone pains.    Female patients MUST use two simultaneous methods of family planning. Accutane is Category X for pregnancy, meaning it will cause fetal teratogenic malformations, and pregnancy MUST be avoided while on this drug. For that reason, the patient is admonished to never share the medication.    The dose is 0.5-1 mg/kg in two divided doses for 15-20 weeks.    After discussion of these important issues, he indicates complete understanding of all of the above, and Does wish to proceed with accutane therapy.      Goal dosage: 8640 mg = Patient weight not available. * 150 mg/kg.    Dose:  Month #10  Oral isotretinoin dosage: 40 mg bid ? Last month    Previous oral isotretinoin dosing:  Month #1 (prescribed on 8/5/2019): 20 mg po BID * 30 days = 1200 mg.  Month #2 (prescribed on 9/5/2019): 30 mg po BID * 30 days = 1800 mg.  Month #3 (prescribed on 10/3/2019): 40 mg po BID * 30 days = 2400 mg.  Month #4 (prescribed on 11/04/2019): 40 mg po BID * 30 days = 2400 mg  Month #5 (prescribed on 12/02/2019): 40 mg po BID * 30 days = 2400 mg  Month #6 (prescribed on 01/30/2020): 30 mg po BID * 30 days =2400 mg  Month #7 (prescribed on 02/26/2020): 40mg po BID * 30 days =2400 mg  Month #9 (prescribed on 03/26/2020): 40mg po BID * 30 days =2400 mg  Month #10 (prescribed on 04/26/2020): 40mg po BID * 30 days =2400 mg  Total dose to date: 19,800 mg    RTC in one month for follow up, or sooner prn, with laboratory  studies completed.    TT: 20 minutes.  CT: 15 minutes        Again, thank you for allowing me to participate in the care of your patient.        Sincerely,        Blanca Anand MD

## 2020-06-25 NOTE — PATIENT INSTRUCTIONS
Isotretinoin 40 mg one cap two times per day, anticipate this may be last month  Stressed needs to get his lab work this week.

## 2020-07-27 ENCOUNTER — OFFICE VISIT (OUTPATIENT)
Dept: URGENT CARE | Facility: URGENT CARE | Age: 20
End: 2020-07-27
Payer: COMMERCIAL

## 2020-07-27 VITALS
OXYGEN SATURATION: 99 % | HEART RATE: 70 BPM | TEMPERATURE: 98.4 F | SYSTOLIC BLOOD PRESSURE: 110 MMHG | DIASTOLIC BLOOD PRESSURE: 62 MMHG

## 2020-07-27 DIAGNOSIS — L03.032 PARONYCHIA OF GREAT TOE, LEFT: Primary | ICD-10-CM

## 2020-07-27 PROCEDURE — 99213 OFFICE O/P EST LOW 20 MIN: CPT | Performed by: PHYSICIAN ASSISTANT

## 2020-07-27 RX ORDER — CEPHALEXIN 500 MG/1
500 CAPSULE ORAL 3 TIMES DAILY
Qty: 30 CAPSULE | Refills: 0 | Status: SHIPPED | OUTPATIENT
Start: 2020-07-27 | End: 2020-08-06

## 2020-07-28 NOTE — PROGRESS NOTES
SUBJECTIVE:  Misbah Thompson is a 20 year old male who presents complaining of left great toe redness, pain and tenderness.  Small amount of drainage near edge of nail.  He has noted some redness and swelling along the cuticle margin.  Symptoms began two days ago.   Severity: mild.  He denies any trauma to the area.  No fevers or chills noted.  No migration of redness or swelling proximally.    No past medical history on file.  Current Outpatient Medications   Medication Sig Dispense Refill     ISOtretinoin (ACCUTANE) 40 MG capsule Take 1 capsule (40 mg) by mouth 2 times daily iPLEDGE #: [id number] 60 capsule 0     cephALEXin (KEFLEX) 500 MG capsule Take 1 capsule (500 mg) by mouth 2 times daily for 7 days 14 capsule 0     silver sulfADIAZINE (SILVADENE) 1 % external cream Apply to left great toe procedure site twice daily with dressing changes until healed. 25 g 0     triamcinolone (KENALOG) 0.1 % external cream Apply topically 2 times daily Apply to AA on arms bid for 10-14 days (Patient not taking: Reported on 7/27/2020) 80 g 0     Social History     Tobacco Use     Smoking status: Never Smoker     Smokeless tobacco: Never Used   Substance Use Topics     Alcohol use: No       ROS:  Review of Systems  Complete ROS negative except as stated above    OBJECTIVE:  /62   Pulse 70   Temp 98.4  F (36.9  C) (Tympanic)   SpO2 99%    Foot exam:  examination of left great toe with  redness, tenderness and swelling along distal toe with swelling around cuticle margin.  Small amount of drainage  GEN  Appears well  SKIN.  No rashes noted    assessment/plan:  (L03.032) Paronychia of great toe, left  (primary encounter diagnosis)  Comment:   Plan: cephALEXin (KEFLEX) 500 MG capsule          Med as directed and warm soaks to toe.  Signs of spreading infection discussed.  Follow-up with PCP as needed

## 2020-09-30 ENCOUNTER — OFFICE VISIT (OUTPATIENT)
Dept: FAMILY MEDICINE | Facility: CLINIC | Age: 20
End: 2020-09-30
Payer: COMMERCIAL

## 2020-09-30 VITALS
TEMPERATURE: 98.2 F | WEIGHT: 147 LBS | RESPIRATION RATE: 20 BRPM | BODY MASS INDEX: 20.58 KG/M2 | HEART RATE: 78 BPM | DIASTOLIC BLOOD PRESSURE: 68 MMHG | SYSTOLIC BLOOD PRESSURE: 102 MMHG | HEIGHT: 71 IN

## 2020-09-30 DIAGNOSIS — L60.0 INGROWN TOENAIL OF LEFT FOOT: ICD-10-CM

## 2020-09-30 DIAGNOSIS — L03.032 PARONYCHIA OF TOE, LEFT: Primary | ICD-10-CM

## 2020-09-30 PROCEDURE — 99213 OFFICE O/P EST LOW 20 MIN: CPT | Performed by: PHYSICIAN ASSISTANT

## 2020-09-30 RX ORDER — SULFAMETHOXAZOLE/TRIMETHOPRIM 800-160 MG
1 TABLET ORAL 2 TIMES DAILY
Qty: 14 TABLET | Refills: 0 | Status: SHIPPED | OUTPATIENT
Start: 2020-09-30 | End: 2020-10-07

## 2020-09-30 ASSESSMENT — MIFFLIN-ST. JEOR: SCORE: 1688.92

## 2020-09-30 NOTE — PATIENT INSTRUCTIONS
Patient Education     Ingrown Toenail, Infected (Antibiotics, No Excision)  An ingrown toenail occurs when the nail grows sideways into the skin alongside the nail. This can cause pain. It can also lead to an infection with redness, swelling, and sometimes drainage.  The most common cause of an ingrown toenail is trimming your nails wrong. Most people trim the nails too close to the skin and try to round the nail too tightly around the shape of the toe. When you do this, the nail can grow into the skin of your toe. It is safer to trim the nail ending in a straight line rather than a curve.  Other causes include injury or wearing shoes that are too short or tight. This can cause the same problem that happens when trimming your nails. Your genetics can also make this more likely to happen.  The following are the most common symptoms of an ingrown toenail:     Pain    Redness    Swelling    Drainage  If the infection is mild, you may be able to take care of it at home with the following measures:    Frequent warm water soaks    Keeping it clean    Wearing loose, comfortable shoes or sandals  Another method involves using a small piece of cotton or waxed dental floss to gently lift up the corner of the problem nail. Change the cotton or floss frequently, especially if it gets dirty.  If your infection is mild, and the above methods aren t working, or if the infection gets worse, see your healthcare provider. Signs of worsening infection include:    Swelling    Redness    Pus drainage  In some cases, you may need antibiotics along with warm soaks. If after 2 to 3 days of antibiotics the toenail doesn't get better or gets worse, part of the nail may need to be removed to drain the infection. With treatment, it can take 1 to 2 weeks to clear up completely.  Home care  Wound care  For the next 3 days, soak and clean your toe in warm water a few times a day.    Twice a day for the first 3 days, clean and soak the toe as  follows:  1. Soak your foot in a tub of warm water for 5 minutes. Or, hold your toe under a faucet of warm running water for 5 minute  2. Clean any remaining crust away with soap and water using a cotton swab.  3. Put a small amount of antibiotic ointment on the infected area.    Change the dressing or bandage every time you soak or clean it, or whenever it becomes wet or dirty.    If you were prescribed antibiotics, take them as directed until they are all gone.    Wear comfortable shoes with a lot of toe room, or open-toe sandals, while your toe is healing.  Medicines    You can take over-the-counter medicine for pain, unless you were given a different pain medicine to use. Note: Talk with your provider before using these medicines if you have chronic liver or kidney disease, ever had a stomach ulcer or GI (gastrointestinal) bleeding, or are taking blood thinner medicines.    If you were given antibiotics, take them until they are used up or your provider tells you to stop, even if the wound looks better. This ensures that the infection clears up.  Prevention  To prevent ingrown toenails:    Wear shoes that fit well. Avoid shoes that pinch the toes together.    When you trim your toenails, do not cut them too short. Cut straight across at the top and don t round the edges.    Don t use a sharp object to clean under your nail since this might cause an infection.    If the toenail starts to grow into the skin again, put a small piece of waxed dental floss or cotton under that side of the nail to help it grow out straight.  Follow-up care  Follow up with your healthcare provider, or as advised. If the antibiotic doesn't work, or if the condition recurs, you may need a minor procedure to have part of the nail removed.  When to seek medical advice  Call your healthcare provider right away if any of the following occur:    Increasing redness, pain, or swelling of the toe    Red streaks in the skin leading away from the  wound    Pus or fluid drainage    Fever of 100.4 F (38 C) or higher, or as directed by your provider  Date Last Reviewed: 12/1/2016 2000-2019 [employer] [address] [place] [address], [place] [address]. All rights reserved. This information is not intended as a substitute for professional medical care. Always follow your healthcare professional's instructions.

## 2020-09-30 NOTE — PROGRESS NOTES
"Subjective     Misbah Thompson is a 20 year old male who presents to clinic today for the following health issues:    HPI       Musculoskeletal problem/pain  Onset/Duration: 3 weeks  Description  Location: big toe - left  Joint Swelling: YES- top of left inside of toe  Redness: YES  Pain: YES - and no  Warmth: no  Intensity:  mild, severe  Progression of Symptoms:  same  Accompanying signs and symptoms:   Fevers: no  Numbness/tingling/weakness: no  History  Trauma to the area: no  Recent illness:  no  Previous similar problem: YES  Previous evaluation:  YES- Urgent care  Precipitating or alleviating factors:  Aggravating factors include: walking  Therapies tried and outcome: antibiotics given by Urgent care in July      Had a partial nail removal in the ER about a year ago. Nail grew back.        Review of Systems   Constitutional, HEENT, cardiovascular, pulmonary, gi and gu systems are negative, except as otherwise noted.        Objective    /68 (BP Location: Right arm, Patient Position: Sitting, Cuff Size: Adult Regular)   Pulse 78   Temp 98.2  F (36.8  C) (Oral)   Resp 20   Ht 1.803 m (5' 11\")   Wt 66.7 kg (147 lb)   BMI 20.50 kg/m    Body mass index is 20.5 kg/m .         Physical Exam   GENERAL: healthy, alert and no distress  EYES: Eyes grossly normal to inspection, PERRL and conjunctivae and sclerae normal  MS: no gross musculoskeletal defects noted, no edema  SKIN: no suspicious lesions or rashes  NEURO: Normal strength and tone, mentation intact and speech normal  PSYCH: mentation appears normal, affect normal/bright  Left great toe: There is erythema and swelling primarily on lateral side. This area is tender to palpation. There appears to be a small amount of purulent material present but not enough to drain. Ingrown toenail present.      No testing indicated.        Assessment & Plan     Paronychia of toe, left    Treat with bactrim for now. Will refer to podiatry to discuss if repeat " removal is indicated.    - sulfamethoxazole-trimethoprim (BACTRIM DS) 800-160 MG tablet  Dispense: 14 tablet; Refill: 0  - Orthopedic & Spine  Referral      Ingrown toenail of left foot    See above.    - Orthopedic & Spine  Referral         Patient Instructions     Patient Education     Ingrown Toenail, Infected (Antibiotics, No Excision)  An ingrown toenail occurs when the nail grows sideways into the skin alongside the nail. This can cause pain. It can also lead to an infection with redness, swelling, and sometimes drainage.  The most common cause of an ingrown toenail is trimming your nails wrong. Most people trim the nails too close to the skin and try to round the nail too tightly around the shape of the toe. When you do this, the nail can grow into the skin of your toe. It is safer to trim the nail ending in a straight line rather than a curve.  Other causes include injury or wearing shoes that are too short or tight. This can cause the same problem that happens when trimming your nails. Your genetics can also make this more likely to happen.  The following are the most common symptoms of an ingrown toenail:     Pain    Redness    Swelling    Drainage  If the infection is mild, you may be able to take care of it at home with the following measures:    Frequent warm water soaks    Keeping it clean    Wearing loose, comfortable shoes or sandals  Another method involves using a small piece of cotton or waxed dental floss to gently lift up the corner of the problem nail. Change the cotton or floss frequently, especially if it gets dirty.  If your infection is mild, and the above methods aren t working, or if the infection gets worse, see your healthcare provider. Signs of worsening infection include:    Swelling    Redness    Pus drainage  In some cases, you may need antibiotics along with warm soaks. If after 2 to 3 days of antibiotics the toenail doesn't get better or gets worse, part of the  nail may need to be removed to drain the infection. With treatment, it can take 1 to 2 weeks to clear up completely.  Home care  Wound care  For the next 3 days, soak and clean your toe in warm water a few times a day.    Twice a day for the first 3 days, clean and soak the toe as follows:  1. Soak your foot in a tub of warm water for 5 minutes. Or, hold your toe under a faucet of warm running water for 5 minute  2. Clean any remaining crust away with soap and water using a cotton swab.  3. Put a small amount of antibiotic ointment on the infected area.    Change the dressing or bandage every time you soak or clean it, or whenever it becomes wet or dirty.    If you were prescribed antibiotics, take them as directed until they are all gone.    Wear comfortable shoes with a lot of toe room, or open-toe sandals, while your toe is healing.  Medicines    You can take over-the-counter medicine for pain, unless you were given a different pain medicine to use. Note: Talk with your provider before using these medicines if you have chronic liver or kidney disease, ever had a stomach ulcer or GI (gastrointestinal) bleeding, or are taking blood thinner medicines.    If you were given antibiotics, take them until they are used up or your provider tells you to stop, even if the wound looks better. This ensures that the infection clears up.  Prevention  To prevent ingrown toenails:    Wear shoes that fit well. Avoid shoes that pinch the toes together.    When you trim your toenails, do not cut them too short. Cut straight across at the top and don t round the edges.    Don t use a sharp object to clean under your nail since this might cause an infection.    If the toenail starts to grow into the skin again, put a small piece of waxed dental floss or cotton under that side of the nail to help it grow out straight.  Follow-up care  Follow up with your healthcare provider, or as advised. If the antibiotic doesn't work, or if the  condition recurs, you may need a minor procedure to have part of the nail removed.  When to seek medical advice  Call your healthcare provider right away if any of the following occur:    Increasing redness, pain, or swelling of the toe    Red streaks in the skin leading away from the wound    Pus or fluid drainage    Fever of 100.4 F (38 C) or higher, or as directed by your provider  Date Last Reviewed: 12/1/2016 2000-2019 [employer]. [address] [place] [address]. All rights reserved. This information is not intended as a substitute for professional medical care. Always follow your healthcare professional's instructions.               No follow-ups on file.    Len Ramirez PA-C  [facility]

## 2020-10-13 ENCOUNTER — OFFICE VISIT (OUTPATIENT)
Dept: PODIATRY | Facility: CLINIC | Age: 20
End: 2020-10-13
Payer: COMMERCIAL

## 2020-10-13 VITALS
WEIGHT: 147 LBS | BODY MASS INDEX: 20.58 KG/M2 | DIASTOLIC BLOOD PRESSURE: 68 MMHG | HEIGHT: 71 IN | SYSTOLIC BLOOD PRESSURE: 116 MMHG

## 2020-10-13 DIAGNOSIS — L60.0 ONYCHOCRYPTOSIS: ICD-10-CM

## 2020-10-13 DIAGNOSIS — L03.032 PARONYCHIA OF GREAT TOE OF LEFT FOOT: Primary | ICD-10-CM

## 2020-10-13 PROCEDURE — 11750 EXCISION NAIL&NAIL MATRIX: CPT | Mod: TA | Performed by: PODIATRIST

## 2020-10-13 PROCEDURE — 99243 OFF/OP CNSLTJ NEW/EST LOW 30: CPT | Mod: 25 | Performed by: PODIATRIST

## 2020-10-13 RX ORDER — CEPHALEXIN 500 MG/1
500 CAPSULE ORAL 2 TIMES DAILY
Qty: 14 CAPSULE | Refills: 0 | Status: SHIPPED | OUTPATIENT
Start: 2020-10-13 | End: 2020-10-20

## 2020-10-13 RX ORDER — SILVER SULFADIAZINE 10 MG/G
CREAM TOPICAL
Qty: 25 G | Refills: 0 | Status: SHIPPED | OUTPATIENT
Start: 2020-10-13

## 2020-10-13 ASSESSMENT — MIFFLIN-ST. JEOR: SCORE: 1688.92

## 2020-10-13 NOTE — LETTER
"    10/13/2020         RE: Misbah Thompson  [id number] [facility] [address]        Dear Colleague,    Thank you for referring your patient, Misbah Thompson, to the [facility] PODI[facility]RY. Please see a copy of my visit note below.    Foot & Ankle Surgery  October 13, 2020    CC: \"infected toe\"    I was asked to see Misbah Thompson regarding the chief complaint by:  ARDEN Ramirez PA-C    HPI:  Pt is a 20 year old male who presents with above complaint.  Left toe issue x 3 weeks.  Wants to \"fix/idea for easier fix\".  \"currently only sensitive to the touch\".  Pain 2/1`0 \"as of recent only when banged/rubbed\".  Worse with \"hard objects\".  \"antibiotics/soaking in warm water\" for treatment, recently put on Bactrim 9/30/20 with ARDEN Ramirez, has helped.  States he had a partial nail removal in the ER approx 1 year ago.      ROS:   Pos for CC.  The patient denies current nausea, vomiting, chills, fevers, belly pain, calf pain, chest pain or SOB.  Complete remainder of ROS is otherwise neg.    VITALS:    Vitals:    10/13/20 1257   BP: 116/68   Weight: 66.7 kg (147 lb)   Height: 1.803 m (5' 11\")       PMH:  Onychocryptosis    SXHX:  Ingrown nail removal approx 1 year ago     MEDS:    Current Outpatient Medications   Medication     ISOtretinoin (ACCUTANE) 40 MG capsule     triamcinolone (KENALOG) 0.1 % external cream     No current facility-administered medications for this visit.        ALL:   No Known Allergies    FMH:  Neg for DMII, HTN    SocHx:    Social History     Socioeconomic History     Marital status: Single     Spouse name: Not on file     Number of children: Not on file     Years of education: Not on file     Highest education level: Not on file   Occupational History     Not on file   Social Needs     Financial resource strain: Not on file     Food insecurity     Worry: Not on file     Inability: Not on file     Transportation needs     Medical: Not on file     Non-medical: Not " on file   Tobacco Use     Smoking status: Never Smoker     Smokeless tobacco: Never Used   Substance and Sexual Activity     Alcohol use: No     Drug use: No     Sexual activity: Never   Lifestyle     Physical activity     Days per week: Not on file     Minutes per session: Not on file     Stress: Not on file   Relationships     Social connections     Talks on phone: Not on file     Gets together: Not on file     Attends Anglican service: Not on file     Active member of club or organization: Not on file     Attends meetings of clubs or organizations: Not on file     Relationship status: Not on file     Intimate partner violence     Fear of current or ex partner: Not on file     Emotionally abused: Not on file     Physically abused: Not on file     Forced sexual activity: Not on file   Other Topics Concern     Not on file   Social History Narrative     Not on file           EXAMINATION:  Gen:   No apparent distress  Neuro:   A&Ox3, no deficits  Psych:    Answering questions appropriately for age and situation with normal affect  Head:    NCAT  Eye:    Visual scanning without deficit  Ear:    Response to auditory stimuli wnl  Lung:    Non-labored breathing on RA noted  Abd:    NTND per patient report  Lymph:    Neg for pitting/non-pitting edema BLE  Vasc:    Pulses palpable, CFT minimally delayed  Neuro:    Light touch sensation intact to all sensory nerve distributions without paresthesias  Derm:    Lateral L hallux onychocryptosis with low-grade paronychia  MSK:    ROM, strength wnl without limitation, no pain on palpation noted.  Calf:    Neg for redness, swelling or tenderness    Assessment:  20 year old male with recurrent onychocryptosis lateral L hallux with low-grade paronychia      Plan:  Discussed etiologies, anatomy and options  1.  Recurrent onychocryptosis lateral L hallux with low-grade paronychia  -Regarding the nail, treatment options were discussed.  They elected to proceed with a procedure, Partial  permanent avulsion.  See procedure note for details.  Risks that were discussed include but are not limited to infection, wound healing complications, nerve irritation, recurrence of the ingrown nail and the need for further procedures.  Antibiotic:  Rx for Keflex 500mg TID x 7 days  -Rx for silvadene  -discussed that paronychia may lower efficacy of phenol, and possibly increase possibility of recurrence.    After discussing the procedure, as well as risks, complications and post-procedure instructions, informed consent was obtained.    Anesthesia:  4 cc's of  1% lidocaine plain    Procedure:  After adequate prep, and with anesthesia achieved, a tourni-cot was applied to the involved toe(and removed after bandage application) and  attention was directed to the lateral border of the L hallux where the nail plate was freed from surrounding soft tissue.  The offending border was  using an English Anvil and then removed in total.  The base of the wound was explored and showed no necrotic tissue, purulence or debris.  Phenol was then applied to the base of the wound for 30s x 3, and sufficient isopropyl alcohol was used to irrigate the wound.  The base of the wound/ nail matrix was curetted after each acid application.  A clean dressing was applied loosely to prevent vascular insult.  The patient tolerated the procedure well without complications.    Post-procedural instructions were dispensed and discussed with the patient.  All questions were answered.           Follow up:  prn or sooner with acute issues      Patient's medical history was reviewed today      Burton Colon DPM FACFAS FACFAOM  Podiatric Foot & Ankle Surgeon  [facility]  [phone]        Again, thank you for allowing me to participate in the care of your patient.        Sincerely,        Burton Colon DPM, DORIE

## 2020-10-13 NOTE — PROGRESS NOTES
"Foot & Ankle Surgery  October 13, 2020    CC: \"infected toe\"    I was asked to see Misbah Thompson regarding the chief complaint by:  ARDEN Ramirez PA-C    HPI:  Pt is a 20 year old male who presents with above complaint.  Left toe issue x 3 weeks.  Wants to \"fix/idea for easier fix\".  \"currently only sensitive to the touch\".  Pain 2/1`0 \"as of recent only when banged/rubbed\".  Worse with \"hard objects\".  \"antibiotics/soaking in warm water\" for treatment, recently put on Bactrim 9/30/20 with ARDEN Ramirez, has helped.  States he had a partial nail removal in the ER approx 1 year ago.      ROS:   Pos for CC.  The patient denies current nausea, vomiting, chills, fevers, belly pain, calf pain, chest pain or SOB.  Complete remainder of ROS is otherwise neg.    VITALS:    Vitals:    10/13/20 1257   BP: 116/68   Weight: 66.7 kg (147 lb)   Height: 1.803 m (5' 11\")       PMH:  Onychocryptosis    SXHX:  Ingrown nail removal approx 1 year ago     MEDS:    Current Outpatient Medications   Medication     ISOtretinoin (ACCUTANE) 40 MG capsule     triamcinolone (KENALOG) 0.1 % external cream     No current facility-administered medications for this visit.        ALL:   No Known Allergies    FMH:  Neg for DMII, HTN    SocHx:    Social History     Socioeconomic History     Marital status: Single     Spouse name: Not on file     Number of children: Not on file     Years of education: Not on file     Highest education level: Not on file   Occupational History     Not on file   Social Needs     Financial resource strain: Not on file     Food insecurity     Worry: Not on file     Inability: Not on file     Transportation needs     Medical: Not on file     Non-medical: Not on file   Tobacco Use     Smoking status: Never Smoker     Smokeless tobacco: Never Used   Substance and Sexual Activity     Alcohol use: No     Drug use: No     Sexual activity: Never   Lifestyle     Physical activity     Days per week: Not on file     Minutes per " session: Not on file     Stress: Not on file   Relationships     Social connections     Talks on phone: Not on file     Gets together: Not on file     Attends Rastafarian service: Not on file     Active member of club or organization: Not on file     Attends meetings of clubs or organizations: Not on file     Relationship status: Not on file     Intimate partner violence     Fear of current or ex partner: Not on file     Emotionally abused: Not on file     Physically abused: Not on file     Forced sexual activity: Not on file   Other Topics Concern     Not on file   Social History Narrative     Not on file           EXAMINATION:  Gen:   No apparent distress  Neuro:   A&Ox3, no deficits  Psych:    Answering questions appropriately for age and situation with normal affect  Head:    NCAT  Eye:    Visual scanning without deficit  Ear:    Response to auditory stimuli wnl  Lung:    Non-labored breathing on RA noted  Abd:    NTND per patient report  Lymph:    Neg for pitting/non-pitting edema BLE  Vasc:    Pulses palpable, CFT minimally delayed  Neuro:    Light touch sensation intact to all sensory nerve distributions without paresthesias  Derm:    Lateral L hallux onychocryptosis with low-grade paronychia  MSK:    ROM, strength wnl without limitation, no pain on palpation noted.  Calf:    Neg for redness, swelling or tenderness    Assessment:  20 year old male with recurrent onychocryptosis lateral L hallux with low-grade paronychia      Plan:  Discussed etiologies, anatomy and options  1.  Recurrent onychocryptosis lateral L hallux with low-grade paronychia  -Regarding the nail, treatment options were discussed.  They elected to proceed with a procedure, Partial permanent avulsion.  See procedure note for details.  Risks that were discussed include but are not limited to infection, wound healing complications, nerve irritation, recurrence of the ingrown nail and the need for further procedures.  Antibiotic:  Rx for Keflex  500mg TID x 7 days  -Rx for silvadene  -discussed that paronychia may lower efficacy of phenol, and possibly increase possibility of recurrence.    After discussing the procedure, as well as risks, complications and post-procedure instructions, informed consent was obtained.    Anesthesia:  4 cc's of  1% lidocaine plain    Procedure:  After adequate prep, and with anesthesia achieved, a tourni-cot was applied to the involved toe(and removed after bandage application) and  attention was directed to the lateral border of the L hallux where the nail plate was freed from surrounding soft tissue.  The offending border was  using an English Anvil and then removed in total.  The base of the wound was explored and showed no necrotic tissue, purulence or debris.  Phenol was then applied to the base of the wound for 30s x 3, and sufficient isopropyl alcohol was used to irrigate the wound.  The base of the wound/ nail matrix was curetted after each acid application.  A clean dressing was applied loosely to prevent vascular insult.  The patient tolerated the procedure well without complications.    Post-procedural instructions were dispensed and discussed with the patient.  All questions were answered.           Follow up:  prn or sooner with acute issues      Patient's medical history was reviewed today      Burton Colon DPM FACFAS FACFAOM  Podiatric Foot & Ankle Surgeon  [facility]  [phone]

## 2020-10-13 NOTE — PATIENT INSTRUCTIONS
Thank you for choosing [facility] Podiatry / Foot & Ankle Surgery!    [facility] SCHEDULE SURGERY: [phone]   [address] BILLING QUESTIONS: [phone]   [place] [address] AFTER HOURS: [phone]   PH: [phone] CONSUMER PRICE LINE:[phone]   FAX: [phone] APPOINTMENTS: [phone]     [facility] TOENAIL REMOVAL AFTERCARE  1. After the procedure, go home and elevate the foot/feet for the remainder of the day/evening as able. This is to minimize swelling, control pain, and limit post-procedural complications. The pre-procedural injection may cause your toe to be numb anywhere from 1-2 hours.    2. You can take Tylenol, Ibuprofen, Advil, etc as needed for pain if tolerated. Follow label instructions.     3. If you have been given a prescription for antibiotics, take them as instructed and complete the entire prescription.    4. Keep dressing intact until the following morning. Then remove the bandage (you may need to soak it in warm soapy water as the bandage will likely adhere to your skin).    5. Start soaking in warm soapy water for 5-10 minutes twice a day. Wash the toe thoroughly, dry the toe thoroughly. Apply antibiotic wound ointment to base of wound and cover with gauze and Coban dressing (not too tightly) until it stops draining. This may take a few days to weeks, but at that point, you may continue with antibiotic ointment and a band-aid, or you may stop applying a dressing all together. Dressing changes should be done twice daily if you had the permanent/chemical procedure done.    6. You may do activities as tolerated the following day. Find a shoe that is comfortable and minimizes the amount of rubbing on your toe, as this may increase pain, swelling, etc.    7. Monitor for signs of infection. With this procedure, it is common to have mild surrounding redness and drainage. If the redness involves the entire toe or if you notice red streaks on top  of your foot, or if you experience any nausea, vomiting, chills, fevers > 101 degrees, call clinic for a quick appointment.

## 2020-12-14 ENCOUNTER — HEALTH MAINTENANCE LETTER (OUTPATIENT)
Age: 20
End: 2020-12-14

## 2021-01-17 ENCOUNTER — NURSE TRIAGE (OUTPATIENT)
Dept: NURSING | Facility: CLINIC | Age: 21
End: 2021-01-17

## 2021-01-17 NOTE — TELEPHONE ENCOUNTER
"Pt hit head when he fell on the ice, back of head and back of right arm.  Neuros intact.  No symptoms.  Pt wanting to know if there is anything he should be watching for.  Pt will follow Care Advice and f/u with clinic if needed.  Taylor Cerna RN, [facility] Nurse Advisor          Additional Information    Negative: [1] ACUTE NEURO SYMPTOM AND [2] present now  (DEFINITION: difficult to awaken OR confused thinking and talking OR slurred speech OR weakness of arms OR unsteady walking)    Negative: Knocked out (unconscious) > 1 minute    Negative: Seizure (convulsion) occurred  (Exception: prior history of seizures and now alert and without Acute Neuro Symptoms)    Negative: Penetrating head injury (e.g., knife, gun shot wound, metal object)    Negative: [1] Major bleeding (e.g., actively dripping or spurting) AND [2] can't be stopped    Negative: [1] Dangerous mechanism of injury (e.g., MVA, diving, trampoline, contact sports, fall > 10 feet or 3 meters) AND [2] NECK pain AND [3] began < 1 hour after injury    Negative: Sounds like a life-threatening emergency to the triager    Negative: Can't remember what happened (amnesia)    Negative: Vomiting once or more    Negative: [1] Loss of vision or double vision AND [2] present now    Negative: Watery or blood-tinged fluid dripping from the NOSE or EARS now  (Exception: tears from crying or nosebleed from nasal trauma)    Negative: One or two \"black eyes\" (bruising, purple color of eyelids)    Negative: Large swelling or bruise > 2 inches (5 cm)    Negative: Skin is split open or gaping  (or length > 1/2 inch or 12 mm)    Negative: [1] Bleeding AND [2] won't stop after 10 minutes of direct pressure (using correct technique)    Negative: Sounds like a serious injury to the triager    Negative: [1] ACUTE NEURO SYMPTOM AND [2] now fine  (DEFINITION: difficult to awaken OR confused thinking and talking OR slurred speech OR weakness of arms OR unsteady walking)    " Negative: [1] Knocked out (unconscious) < 1 minute AND [2] now fine    Negative: [1] SEVERE headache AND [2] not improved 2 hours after pain medicine/ice packs    Negative: Dangerous injury (e.g., MVA, diving, trampoline, contact sports, fall > 10 feet or 3 meters) or severe blow from hard object (e.g., golf club or baseball bat)    Negative: Taking Coumadin (warfarin) or other strong blood thinner, or known bleeding disorder (e.g., thrombocytopenia)    Scalp swelling, bruise or pain    Protocols used: HEAD INJURY-A-AH

## 2021-01-20 ENCOUNTER — OFFICE VISIT (OUTPATIENT)
Dept: FAMILY MEDICINE | Facility: CLINIC | Age: 21
End: 2021-01-20
Payer: COMMERCIAL

## 2021-01-20 VITALS — DIASTOLIC BLOOD PRESSURE: 62 MMHG | SYSTOLIC BLOOD PRESSURE: 118 MMHG

## 2021-01-20 DIAGNOSIS — L70.0 ACNE VULGARIS: Primary | ICD-10-CM

## 2021-01-20 DIAGNOSIS — Z79.899 LONG TERM USE OF ISOTRETINOIN: ICD-10-CM

## 2021-01-20 LAB
BASOPHILS # BLD AUTO: 0 10E9/L (ref 0–0.2)
BASOPHILS NFR BLD AUTO: 0.6 %
DIFFERENTIAL METHOD BLD: NORMAL
EOSINOPHIL # BLD AUTO: 0.1 10E9/L (ref 0–0.7)
EOSINOPHIL NFR BLD AUTO: 1.7 %
ERYTHROCYTE [DISTWIDTH] IN BLOOD BY AUTOMATED COUNT: 12.5 % (ref 10–15)
HCT VFR BLD AUTO: 44.8 % (ref 40–53)
HGB BLD-MCNC: 15.9 G/DL (ref 13.3–17.7)
LYMPHOCYTES # BLD AUTO: 1.7 10E9/L (ref 0.8–5.3)
LYMPHOCYTES NFR BLD AUTO: 33.2 %
MCH RBC QN AUTO: 29.2 PG (ref 26.5–33)
MCHC RBC AUTO-ENTMCNC: 35.5 G/DL (ref 31.5–36.5)
MCV RBC AUTO: 82 FL (ref 78–100)
MONOCYTES # BLD AUTO: 0.4 10E9/L (ref 0–1.3)
MONOCYTES NFR BLD AUTO: 7.5 %
NEUTROPHILS # BLD AUTO: 3 10E9/L (ref 1.6–8.3)
NEUTROPHILS NFR BLD AUTO: 57 %
PLATELET # BLD AUTO: 171 10E9/L (ref 150–450)
RBC # BLD AUTO: 5.45 10E12/L (ref 4.4–5.9)
WBC # BLD AUTO: 5.2 10E9/L (ref 4–11)

## 2021-01-20 PROCEDURE — 36415 COLL VENOUS BLD VENIPUNCTURE: CPT | Performed by: FAMILY MEDICINE

## 2021-01-20 PROCEDURE — 84460 ALANINE AMINO (ALT) (SGPT): CPT | Performed by: FAMILY MEDICINE

## 2021-01-20 PROCEDURE — 99214 OFFICE O/P EST MOD 30 MIN: CPT | Performed by: FAMILY MEDICINE

## 2021-01-20 PROCEDURE — 80061 LIPID PANEL: CPT | Performed by: FAMILY MEDICINE

## 2021-01-20 PROCEDURE — 85025 COMPLETE CBC W/AUTO DIFF WBC: CPT | Performed by: FAMILY MEDICINE

## 2021-01-20 RX ORDER — ISOTRETINOIN 30 MG/1
CAPSULE ORAL
Qty: 60 CAPSULE | Refills: 0 | Status: SHIPPED | OUTPATIENT
Start: 2021-01-20 | End: 2021-03-22 | Stop reason: DRUGHIGH

## 2021-01-20 NOTE — PROGRESS NOTES
[facility] - PRIMARY CARE SKIN      CC: recalcitrant acne, oral isotretinoin therapy  SUBJECTIVE:   Misbah Thompson is a(n) 20 year old male who is here for acne issues :  Has had a return of inflammatory papules and nodules on the jaw and forehead. Would like to restart the oral isotretinoin  .    Previous course of  isotretinoin  completed:10  , he had an excellent result with the oral isotretinoin and no significant side effects        PHQ-2 Score:   PHQ-2 ( 1999 [employer]) 6/25/2020 5/26/2020   Q1: Little interest or pleasure in doing things 0 0   Q2: Feeling down, depressed or hopeless 0 0   PHQ-2 Score 0 0     iPLEDGE #: [id number]    Refer to electronic medical record (EMR) for past medical history and medications.    INTEGUMENTARY/SKIN: POSITIVE for acne  ROS: 14 point review of systems was negative except the symptoms listed above in the HPI.      OBJECTIVE:     Wt Readings from Last 2 Encounters:   10/13/20 147 lb (66.7 kg)   09/30/20 147 lb (66.7 kg)     GENERAL: healthy, alert and no distress.  SKIN: Lebron Skin Type - II.  Face, Neck and Trunk examined.  Skin Pertinent Findings:  Face : moderate - severe scarring on the face, resolving inflammatory papules and nodules    Diagnostic Test Results:  Monitoring CBC, Lipid Profile, AST, (hCG if female).    ASSESSMENT:     Encounter Diagnosis   Name Primary?     Acne vulgaris Yes     Comment: severe acne, oral isotretinoin treatment with monthly monitoring.  MDM: Side effects of oral isotretinoin reviewed - dryness of the skin and mucous membranes, arthralgias, myalgias, mood changes. Discussed potential flare of the acne.    PLAN:   Patient Instructions   FUTURE APPOINTMENTS  Follow up in 28-31 days.    ORAL ISOTRETINOIN INSTRUCTIONS  CURRENT DOSAGE: Take by mouth one 30 mg tablet, two times a day.      Stop all other acne products, except you may use only Cetaphil or CeraVe facial cleanser.    Take the isotretinoin with a fatty meal (such as  "peanut butter or yogurt) to improve the absorption of the medication.        You will need to do a lab blood draw every month:    Schedule blood draw to be completed 1-2 days prior to each office visit  You may complete these at any [facility] lab; just remember to schedule it before you go.      If you are being seen elsewhere by a dermatologist for oral isotretinoin monitoring, be sure to go into iPledge for \"transfer of care\" for the appropriate physician.    Make sure to always  your medication within 3 days of prescription being sent to the pharmacy    Check with your insurance company for coverage of oral isotretinoin therapy for recalcitrant acne. Ask if they have a preferred brand of oral isotretinoin (e.g. Claravis, Myorisan, Zenatane, Amnesteem, Sotret)    RECOMMENDATIONS FOR DRYNESS    Moisturizer : Cetaphil facial moisturizer.    Nasal mist spray : Ocean brand. Use at bedtime.    Do not use sommer-synephrine.    Lips : Aquaphor ointment, Vaseline jelly, or Vanicream lip protectant for the dryness on the lips.    Eye drops : Refresh tears saline eye drops for dry eye symptoms. Consider also use of gel eye drops at bedtime if excessive eye dryness.    Due to dryness of mouth, floss daily and brush teeth at least twice daily.    Consider supplementation of omega 3 oil 1 gram/day.    Make sure to apply sunscreen regularly.        When on oral isotretinoin, discontinue all other acne medications. Discussed the importance of sticking with the oral isotretinoin therapy program, not picking or excoriating.    Oral isotretinoin discussed fully with the patient.  Oral isotretinoin is a very effective drug to treat acne vulgaris but has many significant side effects. Chief among these are teratogenesis, hepatic injury, dyslipidemia and severe drying of the mucous membranes. All of these issues have been discussed in detail. Monthly blood tests to monitor lipids and liver functions will be necessary. Expect " painful dryness and/or fissuring around the lips, eyes, and other moist areas of the body. Balms may be protective. Contact lenses may be too painful to wear temporarily while on this drug. Episodes of significant depression have been reported, including suicidal ideation and attempts in rare cases. It may also cause pseudotumor cerebri and hyperostosis. The patient will report any such changes in mood, depressive symptoms or suicidal thoughts, headaches, joint or bone pains.    Female patients MUST use two simultaneous methods of family planning. Accutane is Category X for pregnancy, meaning it will cause fetal teratogenic malformations, and pregnancy MUST be avoided while on this drug. For that reason, the patient is admonished to never share the medication.    The dose is 0.5-1 mg/kg in two divided doses for 15-20 weeks.    After discussion of these important issues, he indicates complete understanding of all of the above, and Does wish to proceed with accutane therapy.      Goal dosage: 9900 mg - 54540 mg        Previous oral isotretinoin dosing:  Month #1 (prescribed on 01/20/2021 ): 30 mg po BID  30 days = 1200 mg.      RTC in one month for follow up, or sooner prn, with laboratory studies completed.    TT: 20 minutes.  CT: 15 minutes

## 2021-01-20 NOTE — PATIENT INSTRUCTIONS
"FUTURE APPOINTMENTS  Follow up in 28-31 days.    ORAL ISOTRETINOIN INSTRUCTIONS  CURRENT DOSAGE: Take by mouth one 30 mg tablet, two times a day.      Stop all other acne products, except you may use only Cetaphil or CeraVe facial cleanser.    Take the isotretinoin with a fatty meal (such as peanut butter or yogurt) to improve the absorption of the medication.        You will need to do a lab blood draw every month:    Schedule blood draw to be completed 1-2 days prior to each office visit  You may complete these at any [facility] lab; just remember to schedule it before you go.      If you are being seen elsewhere by a dermatologist for oral isotretinoin monitoring, be sure to go into iPledge for \"transfer of care\" for the appropriate physician.    Make sure to always  your medication within 3 days of prescription being sent to the pharmacy    Check with your insurance company for coverage of oral isotretinoin therapy for recalcitrant acne. Ask if they have a preferred brand of oral isotretinoin (e.g. Claravis, Myorisan, Zenatane, Amnesteem, Sotret)    RECOMMENDATIONS FOR DRYNESS    Moisturizer : Cetaphil facial moisturizer.    Nasal mist spray : Ocean brand. Use at bedtime.    Do not use sommer-synephrine.    Lips : Aquaphor ointment, Vaseline jelly, or Vanicream lip protectant for the dryness on the lips.    Eye drops : Refresh tears saline eye drops for dry eye symptoms. Consider also use of gel eye drops at bedtime if excessive eye dryness.    Due to dryness of mouth, floss daily and brush teeth at least twice daily.    Consider supplementation of omega 3 oil 1 gram/day.    Make sure to apply sunscreen regularly.    "

## 2021-01-21 LAB
ALT SERPL W P-5'-P-CCNC: 46 U/L (ref 0–70)
CHOLEST SERPL-MCNC: 181 MG/DL
HDLC SERPL-MCNC: 38 MG/DL
LDLC SERPL CALC-MCNC: 119 MG/DL
NONHDLC SERPL-MCNC: 143 MG/DL
TRIGL SERPL-MCNC: 121 MG/DL

## 2021-01-26 ENCOUNTER — TELEPHONE (OUTPATIENT)
Dept: FAMILY MEDICINE | Facility: CLINIC | Age: 21
End: 2021-01-26

## 2021-01-26 NOTE — TELEPHONE ENCOUNTER
----- Message from Blanca Anand MD sent at 1/26/2021  9:06 AM CST -----  Please call ,    Lab work looks good.     Thank you,  Blanca Anand M.D.

## 2021-02-18 ENCOUNTER — TELEPHONE (OUTPATIENT)
Dept: FAMILY MEDICINE | Facility: CLINIC | Age: 21
End: 2021-02-18

## 2021-02-18 ENCOUNTER — OFFICE VISIT (OUTPATIENT)
Dept: FAMILY MEDICINE | Facility: CLINIC | Age: 21
End: 2021-02-18
Payer: COMMERCIAL

## 2021-02-18 ENCOUNTER — MYC MEDICAL ADVICE (OUTPATIENT)
Dept: FAMILY MEDICINE | Facility: CLINIC | Age: 21
End: 2021-02-18

## 2021-02-18 VITALS — DIASTOLIC BLOOD PRESSURE: 62 MMHG | SYSTOLIC BLOOD PRESSURE: 122 MMHG

## 2021-02-18 DIAGNOSIS — L70.0 ACNE VULGARIS: ICD-10-CM

## 2021-02-18 DIAGNOSIS — L85.3 XEROSIS OF SKIN: ICD-10-CM

## 2021-02-18 DIAGNOSIS — Z51.81 ENCOUNTER FOR THERAPEUTIC DRUG MONITORING: Primary | ICD-10-CM

## 2021-02-18 DIAGNOSIS — L81.0 POST-INFLAMMATORY HYPERPIGMENTATION: ICD-10-CM

## 2021-02-18 LAB
ERYTHROCYTE [DISTWIDTH] IN BLOOD BY AUTOMATED COUNT: 13.1 % (ref 10–15)
HCT VFR BLD AUTO: 42.6 % (ref 40–53)
HGB BLD-MCNC: 14.9 G/DL (ref 13.3–17.7)
MCH RBC QN AUTO: 28.8 PG (ref 26.5–33)
MCHC RBC AUTO-ENTMCNC: 35 G/DL (ref 31.5–36.5)
MCV RBC AUTO: 82 FL (ref 78–100)
PLATELET # BLD AUTO: 181 10E9/L (ref 150–450)
RBC # BLD AUTO: 5.18 10E12/L (ref 4.4–5.9)
WBC # BLD AUTO: 5.7 10E9/L (ref 4–11)

## 2021-02-18 PROCEDURE — 80053 COMPREHEN METABOLIC PANEL: CPT | Performed by: PHYSICIAN ASSISTANT

## 2021-02-18 PROCEDURE — 80061 LIPID PANEL: CPT | Performed by: PHYSICIAN ASSISTANT

## 2021-02-18 PROCEDURE — 36415 COLL VENOUS BLD VENIPUNCTURE: CPT | Performed by: PHYSICIAN ASSISTANT

## 2021-02-18 PROCEDURE — 85027 COMPLETE CBC AUTOMATED: CPT | Performed by: PHYSICIAN ASSISTANT

## 2021-02-18 PROCEDURE — 99213 OFFICE O/P EST LOW 20 MIN: CPT | Performed by: PHYSICIAN ASSISTANT

## 2021-02-18 RX ORDER — ISOTRETINOIN 40 MG/1
40 CAPSULE ORAL 2 TIMES DAILY
Qty: 60 CAPSULE | Refills: 0 | Status: SHIPPED | OUTPATIENT
Start: 2021-02-18 | End: 2021-03-22 | Stop reason: DRUGHIGH

## 2021-02-18 NOTE — PROGRESS NOTES
HPI:  Misbah Thompson is a 21 year old male patient here today for acne on face on accutane. Has completed one month of accutane. Tolerating well. Has noticed some dryness and nose bleeds. Nose bleeds are short in duration. This is pt second course of accutane.  .  Patient states this has been present for a while.  Patient reports the following symptoms: dryness. Denies mood changes. .  Patient reports the following previous treatments: accutane.  Patient reports the following modifying factors: none.  Associated symptoms: none.  Patient has no other skin complaints today.  Remainder of the HPI, Meds, PMH, Allergies, FH, and SH was reviewed in chart.      No past medical history on file.    No past surgical history on file.     No family history on file.    Social History     Socioeconomic History     Marital status: Single     Spouse name: Not on file     Number of children: Not on file     Years of education: Not on file     Highest education level: Not on file   Occupational History     Not on file   Social Needs     Financial resource strain: Not on file     Food insecurity     Worry: Not on file     Inability: Not on file     Transportation needs     Medical: Not on file     Non-medical: Not on file   Tobacco Use     Smoking status: Never Smoker     Smokeless tobacco: Never Used   Substance and Sexual Activity     Alcohol use: No     Drug use: No     Sexual activity: Never   Lifestyle     Physical activity     Days per week: Not on file     Minutes per session: Not on file     Stress: Not on file   Relationships     Social connections     Talks on phone: Not on file     Gets together: Not on file     Attends Faith service: Not on file     Active member of club or organization: Not on file     Attends meetings of clubs or organizations: Not on file     Relationship status: Not on file     Intimate partner violence     Fear of current or ex partner: Not on file     Emotionally abused: Not on file      Physically abused: Not on file     Forced sexual activity: Not on file   Other Topics Concern     Not on file   Social History Narrative     Not on file       Outpatient Encounter Medications as of 2/18/2021   Medication Sig Dispense Refill     ISOtretinoin (ABSORICA) 30 MG capsule 1 tab po bid [id number] 60 capsule 0     ISOtretinoin (ACCUTANE) 40 MG capsule Take 1 capsule (40 mg) by mouth 2 times daily iPLEDGE #: [id number] 60 capsule 0     silver sulfADIAZINE (SILVADENE) 1 % external cream Apply to left great toe procedure site twice daily with dressing changes until healed. 25 g 0     triamcinolone (KENALOG) 0.1 % external cream Apply topically 2 times daily Apply to AA on arms bid for 10-14 days 80 g 0     No facility-administered encounter medications on file as of 2/18/2021.        Review Of Systems:  Skin: acne  Eyes: negative  Ears/Nose/Throat: negative  Respiratory: No shortness of breath, dyspnea on exertion, cough, or hemoptysis  Cardiovascular: negative  Gastrointestinal: negative  Genitourinary: negative  Musculoskeletal: negative  Neurologic: negative  Psychiatric: negative  Hematologic/Lymphatic/Immunologic: negative  Endocrine: negative      Objective:     /62   Eyes: Conjunctivae/lids: Normal   ENT: Lips:  Normal  MSK: Normal  Cardiovascular: Peripheral edema none  Pulm: Breathing Normal  Neuro/Psych: Orientation: A/O x 3 Normal; Mood/Affect: Normal, NAD, WDWN  Pt accompanied by: self  Following areas examined: face, neck, ears, hands  Lebron skin type:ii   Findings:  Light brown/pink smooth macules of face  Generalized flaking of skin of lips  Inflammatory papules on face  Assessment and Plan:  1) acne vulgaris, PIH, xerosis of skin, medication monitoring  Standing CBC, CMP, and fasting lipids (for females include urine pregnancy test)  Ipledge reviewed with patient and Ipledge consent form complete  Patient place in ipledge system yes 2/18/21  Prescription sent to pharmacy yes 40mg  BID  Ipledge: [id number]  Target:9900 mg - 94792 mg  Weight :per pt 140  Return to clinic 30 days  No history of depression or suicidal tendencies.  Isotretinoin (Accutane) education:  Do not share medication, do not donate blood, and do not get pregnant while on accutane.  While on Accutane: do not use other topical acne medications. Do not share medication. Do not get pregnant (including one month after stopping medication). Do not donate blood. Do not wax. Do not get laser, peels, or aggressive facials while on Accutane of for 6 months after.   Females must  their prescription within 7 days of having urine pregnancy test.  Dry lips and mouth, minor swelling of the eyelids or lips, crusty skin, nosebleeds, GI upset, or thinning of hair may occur. If any of these effects persist or worsen, tell your doctor or pharmacist promptly.   To relieve dry mouth, suck on (sugarless) hard candy or ice chips, chew (sugarless) gum, drink water.   Remember that your doctor has prescribed this medication because he or she has judged that the benefit to you is greater than the risk of side effects. Many people using this medication do not have serious side effects.   Contact office immediately if you have any of these unlikely but serious side effects: mental/mood changes (e.g., depression,  aggressive or violent behavior, and in rare cases, thoughts of suicide), tingling feeling in the skin, quick/severe sun sensitivity, back/joint/muscle pain, signs of infection (e.g., fever, persistent sore throat, painful swallowing, peeling skin on palms/soles.   Isotretinoin may infrequently cause disease of the pancreatitis, that may rarely be fatal. Stop taking this medication and contact office immediately if you develop: severe stomach pain severe or persistent GI upset,   Stop taking this medication and tell your doctor immediately if you develop these unlikely but very serious side effects: severe headache, vision changes, ear  ringing, hearling loss, chest pain, yellowing eyes, skin, dark urine, severe diarrhea, rectal bleeding,   Seek immediate medical attention if you notice any symptoms of a serious allergic reaction.  Wait 6 months after stopping accutane before getting piercing, tattoos, and/or laser treatment.    Accutane is discussed fully with the patient. It is a very effective drug to treat acne vulgaris but has many potential significant side effects. Chief among these are teratogensis, hepatic injury, dyslipidemia and severe drying of the mucous membranes. All of these issues have been discussed in details. Monthly blood tests to monitor lipids and liver functions will be necessary. Expect painful dryness and/or fissuring around the lips, eyes, and other moist areas of the body. Balms may be protective. Contact lens may be too painful to wear temporarily while on this drug. Episodes of significant depression have been reported, including suicidal ideation and attempts in rare cases. It may also cause pseudotumor cerebri and hyperostosis. The patient will report any such changes in mood, depressive symptoms or suicidal thoughts, headaches, joint or bone pains. There is also a possible association with inflammatory bowel disease, although this is unproven at this point.         Follow up in 30 days

## 2021-02-18 NOTE — LETTER
2/18/2021         RE: Misbah Thompson  [address] [address] [place] [address]        Dear Colleague,    Thank you for referring your patient, Misbah Thompson, to the [facility]. Please see a copy of my visit note below.    HPI:  Misbah Thompson is a 21 year old male patient here today for acne on face on accutane. Has completed one month of accutane. Tolerating well. Has noticed some dryness and nose bleeds. Nose bleeds are short in duration. This is pt second course of accutane.  .  Patient states this has been present for a while.  Patient reports the following symptoms: dryness. Denies mood changes. .  Patient reports the following previous treatments: accutane.  Patient reports the following modifying factors: none.  Associated symptoms: none.  Patient has no other skin complaints today.  Remainder of the HPI, Meds, PMH, Allergies, FH, and SH was reviewed in chart.      No past medical history on file.    No past surgical history on file.     No family history on file.    Social History     Socioeconomic History     Marital status: Single     Spouse name: Not on file     Number of children: Not on file     Years of education: Not on file     Highest education level: Not on file   Occupational History     Not on file   Social Needs     Financial resource strain: Not on file     Food insecurity     Worry: Not on file     Inability: Not on file     Transportation needs     Medical: Not on file     Non-medical: Not on file   Tobacco Use     Smoking status: Never Smoker     Smokeless tobacco: Never Used   Substance and Sexual Activity     Alcohol use: No     Drug use: No     Sexual activity: Never   Lifestyle     Physical activity     Days per week: Not on file     Minutes per session: Not on file     Stress: Not on file   Relationships     Social connections     Talks on phone: Not on file     Gets together: Not on file     Attends Oriental orthodox service: Not on file      Active member of club or organization: Not on file     Attends meetings of clubs or organizations: Not on file     Relationship status: Not on file     Intimate partner violence     Fear of current or ex partner: Not on file     Emotionally abused: Not on file     Physically abused: Not on file     Forced sexual activity: Not on file   Other Topics Concern     Not on file   Social History Narrative     Not on file       Outpatient Encounter Medications as of 2/18/2021   Medication Sig Dispense Refill     ISOtretinoin (ABSORICA) 30 MG capsule 1 tab po bid [id number] 60 capsule 0     ISOtretinoin (ACCUTANE) 40 MG capsule Take 1 capsule (40 mg) by mouth 2 times daily iPLEDGE #: [id number] 60 capsule 0     silver sulfADIAZINE (SILVADENE) 1 % external cream Apply to left great toe procedure site twice daily with dressing changes until healed. 25 g 0     triamcinolone (KENALOG) 0.1 % external cream Apply topically 2 times daily Apply to AA on arms bid for 10-14 days 80 g 0     No facility-administered encounter medications on file as of 2/18/2021.        Review Of Systems:  Skin: acne  Eyes: negative  Ears/Nose/Throat: negative  Respiratory: No shortness of breath, dyspnea on exertion, cough, or hemoptysis  Cardiovascular: negative  Gastrointestinal: negative  Genitourinary: negative  Musculoskeletal: negative  Neurologic: negative  Psychiatric: negative  Hematologic/Lymphatic/Immunologic: negative  Endocrine: negative      Objective:     /62   Eyes: Conjunctivae/lids: Normal   ENT: Lips:  Normal  MSK: Normal  Cardiovascular: Peripheral edema none  Pulm: Breathing Normal  Neuro/Psych: Orientation: A/O x 3 Normal; Mood/Affect: Normal, NAD, WDWN  Pt accompanied by: self  Following areas examined: face, neck, ears, hands  Lebron skin type:ii   Findings:  Light brown/pink smooth macules of face  Generalized flaking of skin of lips  Inflammatory papules on face  Assessment and Plan:  1) acne vulgaris, PIH, xerosis  of skin, medication monitoring  Standing CBC, CMP, and fasting lipids (for females include urine pregnancy test)  Ipledge reviewed with patient and Ipledge consent form complete  Patient place in ipledge system yes 2/18/21  Prescription sent to pharmacy no  Ipledge: [id number]  Target:9900 mg - 90624 mg  Weight :per pt 140  Return to clinic 30 days  No history of depression or suicidal tendencies.  Isotretinoin (Accutane) education:  Do not share medication, do not donate blood, and do not get pregnant while on accutane.  While on Accutane: do not use other topical acne medications. Do not share medication. Do not get pregnant (including one month after stopping medication). Do not donate blood. Do not wax. Do not get laser, peels, or aggressive facials while on Accutane of for 6 months after.   Females must  their prescription within 7 days of having urine pregnancy test.  Dry lips and mouth, minor swelling of the eyelids or lips, crusty skin, nosebleeds, GI upset, or thinning of hair may occur. If any of these effects persist or worsen, tell your doctor or pharmacist promptly.   To relieve dry mouth, suck on (sugarless) hard candy or ice chips, chew (sugarless) gum, drink water.   Remember that your doctor has prescribed this medication because he or she has judged that the benefit to you is greater than the risk of side effects. Many people using this medication do not have serious side effects.   Contact office immediately if you have any of these unlikely but serious side effects: mental/mood changes (e.g., depression,  aggressive or violent behavior, and in rare cases, thoughts of suicide), tingling feeling in the skin, quick/severe sun sensitivity, back/joint/muscle pain, signs of infection (e.g., fever, persistent sore throat, painful swallowing, peeling skin on palms/soles.   Isotretinoin may infrequently cause disease of the pancreatitis, that may rarely be fatal. Stop taking this medication and  contact office immediately if you develop: severe stomach pain severe or persistent GI upset,   Stop taking this medication and tell your doctor immediately if you develop these unlikely but very serious side effects: severe headache, vision changes, ear ringing, hearling loss, chest pain, yellowing eyes, skin, dark urine, severe diarrhea, rectal bleeding,   Seek immediate medical attention if you notice any symptoms of a serious allergic reaction.  Wait 6 months after stopping accutane before getting piercing, tattoos, and/or laser treatment.    Accutane is discussed fully with the patient. It is a very effective drug to treat acne vulgaris but has many potential significant side effects. Chief among these are teratogensis, hepatic injury, dyslipidemia and severe drying of the mucous membranes. All of these issues have been discussed in details. Monthly blood tests to monitor lipids and liver functions will be necessary. Expect painful dryness and/or fissuring around the lips, eyes, and other moist areas of the body. Balms may be protective. Contact lens may be too painful to wear temporarily while on this drug. Episodes of significant depression have been reported, including suicidal ideation and attempts in rare cases. It may also cause pseudotumor cerebri and hyperostosis. The patient will report any such changes in mood, depressive symptoms or suicidal thoughts, headaches, joint or bone pains. There is also a possible association with inflammatory bowel disease, although this is unproven at this point.         Follow up in 30 days        Again, thank you for allowing me to participate in the care of your patient.        Sincerely,        Susanne Pop PA-C

## 2021-02-19 LAB
ALBUMIN SERPL-MCNC: 3.8 G/DL (ref 3.4–5)
ALP SERPL-CCNC: 91 U/L (ref 40–150)
ALT SERPL W P-5'-P-CCNC: 54 U/L (ref 0–70)
ANION GAP SERPL CALCULATED.3IONS-SCNC: 6 MMOL/L (ref 3–14)
AST SERPL W P-5'-P-CCNC: 25 U/L (ref 0–45)
BILIRUB SERPL-MCNC: 0.2 MG/DL (ref 0.2–1.3)
BUN SERPL-MCNC: 18 MG/DL (ref 7–30)
CALCIUM SERPL-MCNC: 9.2 MG/DL (ref 8.5–10.1)
CHLORIDE SERPL-SCNC: 109 MMOL/L (ref 94–109)
CHOLEST SERPL-MCNC: 183 MG/DL
CO2 SERPL-SCNC: 25 MMOL/L (ref 20–32)
CREAT SERPL-MCNC: 0.77 MG/DL (ref 0.66–1.25)
GFR SERPL CREATININE-BSD FRML MDRD: >90 ML/MIN/{1.73_M2}
GLUCOSE SERPL-MCNC: 81 MG/DL (ref 70–99)
HDLC SERPL-MCNC: 35 MG/DL
LDLC SERPL CALC-MCNC: 117 MG/DL
NONHDLC SERPL-MCNC: 148 MG/DL
POTASSIUM SERPL-SCNC: 4.4 MMOL/L (ref 3.4–5.3)
PROT SERPL-MCNC: 7.3 G/DL (ref 6.8–8.8)
SODIUM SERPL-SCNC: 140 MMOL/L (ref 133–144)
TRIGL SERPL-MCNC: 155 MG/DL

## 2021-03-20 NOTE — PROGRESS NOTES
[facility] - PRIMARY CARE SKIN    CC : Acne and Oral Isotretinoin Follow-up   SUBJECTIVE:                                                    Misbah Thompson is a 21 year old male who presents to clinic today for follow-up of oral isotretinoin therapy for severe, recalcitrant acne.    Months completed :2  Current dosage : 40 mg bid.  Treatment response : few nodules on the face   Side effects noted : Dryness.    PHQ-9 Score:  No flowsheet data found.  PHQ-2 Score:   PHQ-2 ( 1999 Pfizer) 2/18/2021 6/25/2020   Q1: Little interest or pleasure in doing things 0 0   Q2: Feeling down, depressed or hopeless 0 0   PHQ-2 Score 0 0     No flowsheet data found.    iPLEDGE #: [id number]    Refer to electronic medical record (EMR) for past medical history and medications.      ROS : 14 point review of systems was negative except the symptoms listed above in the HPI.      OBJECTIVE:                                                      Wt Readings from Last 2 Encounters:   10/13/20 147 lb (66.7 kg)   09/30/20 147 lb (66.7 kg)     GENERAL: healthy, alert and no distress  SKIN: Lebron Skin Type - II.  Face were examined. The dermatoscope was used to help evaluate pigmented lesions.  Skin Pertinent Findings:    Face: residual moderate scarring, inflammatory papules .    Chest/ back : clear    Diagnostic Test Results:  No results found for this or any previous visit (from the past 24 hour(s)).          ASSESSMENT:                                                      Encounter Diagnoses   Name Primary?     Acne vulgaris Yes     Long term use of isotretinoin      Comment : severe acne, oral isotretinoin treatment with monthly monitoring.        PLAN:                                                    Patient Instructions   Isotretinoin 40 mg one tab two times per day  Recheck in 4 weeks  Lab work next visit        RTC in one month for follow up, or sooner prn, with laboratory studies completed.    Oral isotretinoin is again  discussed fully with the patient .    It is a very effective drug to treat acne vulgaris but has many significant side effects. Chief among these are teratogenesis, hepatic injury, dyslipidemia and severe drying of the mucous membranes. All of these issues have been discussed in detail. Monthly blood tests to monitor lipids and liver functions will be necessary. Expect painful dryness and/or fissuring around the lips, eyes, and other moist areas of the body. Balms may be protective. Contact lenses may be too painful to wear temporarily while on this drug. Episodes of significant depression have been reported, including suicidal ideation and attempts in rare cases. It may also cause pseudotumor cerebri and hyperostosis. The patient will report any such changes in mood, depressive symptoms or suicidal thoughts, headaches, joint or bone pains.    Female patients MUST use two simultaneous methods of family planning. Accutane is Category X for pregnancy, meaning it will cause fetal teratogenic malformations, and pregnancy MUST be avoided while on this drug. For that reason, the patient is admonished to never share the medication.    The dose is 0.5-1 mg/kg in two divided doses daily for 15-20 weeks.    After discussion of these important issues, he indicates complete understanding of all of the above, and Does wish to proceed with accutane therapy. Discussed the importance of sticking with the program, not picking or excoriating.        Oral isotretinoin dosing:  Month #1: 30 mg bid =1800  Month#2 40 mg bid = 2400    Total dose to date : 4200 mg  Goal dosage : 10.500 mg      PROCEDURES:                                                    None.    TT : 20 minutes.  CT : 15 minutes, with 50% of time spent reviewing lab work and counseling patient about side effects, benefits and risks of oral isotretinoin.

## 2021-03-22 ENCOUNTER — OFFICE VISIT (OUTPATIENT)
Dept: FAMILY MEDICINE | Facility: CLINIC | Age: 21
End: 2021-03-22
Payer: COMMERCIAL

## 2021-03-22 VITALS — DIASTOLIC BLOOD PRESSURE: 72 MMHG | SYSTOLIC BLOOD PRESSURE: 122 MMHG | WEIGHT: 144 LBS | BODY MASS INDEX: 20.08 KG/M2

## 2021-03-22 DIAGNOSIS — L70.0 ACNE VULGARIS: Primary | ICD-10-CM

## 2021-03-22 DIAGNOSIS — Z79.899 LONG TERM USE OF ISOTRETINOIN: ICD-10-CM

## 2021-03-22 PROCEDURE — 99213 OFFICE O/P EST LOW 20 MIN: CPT | Performed by: FAMILY MEDICINE

## 2021-03-22 RX ORDER — ISOTRETINOIN 40 MG/1
40 CAPSULE ORAL 2 TIMES DAILY
Qty: 60 CAPSULE | Refills: 0 | Status: SHIPPED | OUTPATIENT
Start: 2021-03-22 | End: 2021-04-19

## 2021-04-17 NOTE — PROGRESS NOTES
[facility] - PRIMARY CARE SKIN    CC : Acne and Oral Isotretinoin Follow-up   SUBJECTIVE:                                                    Misbah Thompson is a 21 year old male who presents to clinic today for follow-up of oral isotretinoin therapy for severe, recalcitrant acne.    Months completed :3  Current dosage : 40 mg bid.  Treatment response : few nodules on the face   Side effects noted : Dryness.    PHQ-9 Score:  No flowsheet data found.  PHQ-2 Score:   PHQ-2 ( 1999 Pfizer) 3/22/2021 2/18/2021   Q1: Little interest or pleasure in doing things 0 0   Q2: Feeling down, depressed or hopeless 0 0   PHQ-2 Score 0 0     No flowsheet data found.    iPLEDGE #: [id number]    Refer to electronic medical record (EMR) for past medical history and medications.      ROS : 14 point review of systems was negative except the symptoms listed above in the HPI.      OBJECTIVE:                                                      Wt Readings from Last 2 Encounters:   03/22/21 144 lb (65.3 kg)   10/13/20 147 lb (66.7 kg)     GENERAL: healthy, alert and no distress  SKIN: Lebron Skin Type - II.  Face were examined. The dermatoscope was used to help evaluate pigmented lesions.  Skin Pertinent Findings:    Face: residual moderate scarring, no inflammatory papules at this time.    Chest/ back : clear    Diagnostic Test Results:  No results found for this or any previous visit (from the past 24 hour(s)).          ASSESSMENT:                                                      Encounter Diagnoses   Name Primary?     Acne vulgaris Yes     Long term use of isotretinoin      Comment : severe acne, oral isotretinoin treatment with monthly monitoring.        PLAN:                                                    Patient Instructions   Isotretinoin 40 mg one tab orally two times per day  Recheck in 4 weeks          RTC in one month for follow up, or sooner prn, with laboratory studies completed.    Oral isotretinoin is  again discussed fully with the patient .    It is a very effective drug to treat acne vulgaris but has many significant side effects. Chief among these are teratogenesis, hepatic injury, dyslipidemia and severe drying of the mucous membranes. All of these issues have been discussed in detail. Monthly blood tests to monitor lipids and liver functions will be necessary. Expect painful dryness and/or fissuring around the lips, eyes, and other moist areas of the body. Balms may be protective. Contact lenses may be too painful to wear temporarily while on this drug. Episodes of significant depression have been reported, including suicidal ideation and attempts in rare cases. It may also cause pseudotumor cerebri and hyperostosis. The patient will report any such changes in mood, depressive symptoms or suicidal thoughts, headaches, joint or bone pains.    Female patients MUST use two simultaneous methods of family planning. Accutane is Category X for pregnancy, meaning it will cause fetal teratogenic malformations, and pregnancy MUST be avoided while on this drug. For that reason, the patient is admonished to never share the medication.    The dose is 0.5-1 mg/kg in two divided doses daily for 15-20 weeks.    After discussion of these important issues, he indicates complete understanding of all of the above, and Does wish to proceed with accutane therapy. Discussed the importance of sticking with the program, not picking or excoriating.        Oral isotretinoin dosing:  Month #1: 30 mg bid =1800  Month#2 40 mg bid = 2400  Month#3 40 mg bid = 2400  Total dose to date : 6400  mg  Goal dosage : 10.500 mg      PROCEDURES:                                                    None.

## 2021-04-19 ENCOUNTER — OFFICE VISIT (OUTPATIENT)
Dept: FAMILY MEDICINE | Facility: CLINIC | Age: 21
End: 2021-04-19
Payer: COMMERCIAL

## 2021-04-19 VITALS — SYSTOLIC BLOOD PRESSURE: 112 MMHG | DIASTOLIC BLOOD PRESSURE: 80 MMHG

## 2021-04-19 DIAGNOSIS — Z79.899 LONG TERM USE OF ISOTRETINOIN: ICD-10-CM

## 2021-04-19 DIAGNOSIS — L70.0 ACNE VULGARIS: Primary | ICD-10-CM

## 2021-04-19 LAB
ALT SERPL W P-5'-P-CCNC: 32 U/L (ref 0–70)
BASOPHILS # BLD AUTO: 0 10E9/L (ref 0–0.2)
BASOPHILS NFR BLD AUTO: 0.6 %
CHOLEST SERPL-MCNC: 197 MG/DL
DIFFERENTIAL METHOD BLD: NORMAL
EOSINOPHIL # BLD AUTO: 0.1 10E9/L (ref 0–0.7)
EOSINOPHIL NFR BLD AUTO: 1.7 %
ERYTHROCYTE [DISTWIDTH] IN BLOOD BY AUTOMATED COUNT: 12.9 % (ref 10–15)
HCT VFR BLD AUTO: 43.9 % (ref 40–53)
HDLC SERPL-MCNC: 33 MG/DL
HGB BLD-MCNC: 15.4 G/DL (ref 13.3–17.7)
LDLC SERPL CALC-MCNC: 139 MG/DL
LYMPHOCYTES # BLD AUTO: 1.9 10E9/L (ref 0.8–5.3)
LYMPHOCYTES NFR BLD AUTO: 35.4 %
MCH RBC QN AUTO: 28.9 PG (ref 26.5–33)
MCHC RBC AUTO-ENTMCNC: 35.1 G/DL (ref 31.5–36.5)
MCV RBC AUTO: 82 FL (ref 78–100)
MONOCYTES # BLD AUTO: 0.4 10E9/L (ref 0–1.3)
MONOCYTES NFR BLD AUTO: 7.8 %
NEUTROPHILS # BLD AUTO: 2.9 10E9/L (ref 1.6–8.3)
NEUTROPHILS NFR BLD AUTO: 54.5 %
NONHDLC SERPL-MCNC: 164 MG/DL
PLATELET # BLD AUTO: 182 10E9/L (ref 150–450)
RBC # BLD AUTO: 5.33 10E12/L (ref 4.4–5.9)
TRIGL SERPL-MCNC: 124 MG/DL
WBC # BLD AUTO: 5.2 10E9/L (ref 4–11)

## 2021-04-19 PROCEDURE — 84460 ALANINE AMINO (ALT) (SGPT): CPT | Performed by: FAMILY MEDICINE

## 2021-04-19 PROCEDURE — 80061 LIPID PANEL: CPT | Performed by: FAMILY MEDICINE

## 2021-04-19 PROCEDURE — 99213 OFFICE O/P EST LOW 20 MIN: CPT | Performed by: FAMILY MEDICINE

## 2021-04-19 PROCEDURE — 36415 COLL VENOUS BLD VENIPUNCTURE: CPT | Performed by: FAMILY MEDICINE

## 2021-04-19 PROCEDURE — 85025 COMPLETE CBC W/AUTO DIFF WBC: CPT | Performed by: FAMILY MEDICINE

## 2021-04-19 RX ORDER — ISOTRETINOIN 40 MG/1
40 CAPSULE ORAL 2 TIMES DAILY
Qty: 60 CAPSULE | Refills: 0 | Status: SHIPPED | OUTPATIENT
Start: 2021-04-19 | End: 2021-05-24

## 2021-04-20 ENCOUNTER — TELEPHONE (OUTPATIENT)
Dept: FAMILY MEDICINE | Facility: CLINIC | Age: 21
End: 2021-04-20

## 2021-04-20 NOTE — LETTER
April 20, 2021    Misbah Thompson  [id number] [address] [id number]        Dear Misbah,    This is a letter regarding your completed lab results. The tested values are below and were normal.    Office Visit on 04/19/2021   Component Date Value Ref Range Status     Cholesterol 04/19/2021 197  <200 mg/dL Final     Triglycerides 04/19/2021 124  <150 mg/dL Final     HDL Cholesterol 04/19/2021 33* >39 mg/dL Final     LDL Cholesterol Calculated 04/19/2021 139* <100 mg/dL Final     Non HDL Cholesterol 04/19/2021 164* <130 mg/dL Final     WBC 04/19/2021 5.2  4.0 - 11.0 10e9/L Final     RBC Count 04/19/2021 5.33  4.4 - 5.9 10e12/L Final     Hemoglobin 04/19/2021 15.4  13.3 - 17.7 g/dL Final     Hematocrit 04/19/2021 43.9  40.0 - 53.0 % Final     MCV 04/19/2021 82  78 - 100 fl Final     MCH 04/19/2021 28.9  26.5 - 33.0 pg Final     MCHC 04/19/2021 35.1  31.5 - 36.5 g/dL Final     RDW 04/19/2021 12.9  10.0 - 15.0 % Final     Platelet Count 04/19/2021 182  150 - 450 10e9/L Final     Diff Method 04/19/2021 Automated Method   Final     % Neutrophils 04/19/2021 54.5  % Final     % Lymphocytes 04/19/2021 35.4  % Final     % Monocytes 04/19/2021 7.8  % Final     % Eosinophils 04/19/2021 1.7  % Final     % Basophils 04/19/2021 0.6  % Final     Absolute Neutrophil 04/19/2021 2.9  1.6 - 8.3 10e9/L Final     Absolute Lymphocytes 04/19/2021 1.9  0.8 - 5.3 10e9/L Final     Absolute Monocytes 04/19/2021 0.4  0.0 - 1.3 10e9/L Final     Absolute Eosinophils 04/19/2021 0.1  0.0 - 0.7 10e9/L Final     Absolute Basophils 04/19/2021 0.0  0.0 - 0.2 10e9/L Final     ALT 04/19/2021 32  0 - 70 U/L Final         Thank you for allowing me to be involved in your health care and for choosing [facility].  If you have any questions or concerns please feel free to contact me at [phone].      Sincerely,      Blanca Anand M.D.

## 2021-04-20 NOTE — TELEPHONE ENCOUNTER
----- Message from Blanca Anand MD sent at 4/20/2021  8:37 AM CDT -----  Please call,      Labs look ok.    Thank you,   Blnaca Anand M.D.

## 2021-05-22 NOTE — PROGRESS NOTES
[facility] - PRIMARY CARE SKIN    CC : Acne and Oral Isotretinoin Follow-up   SUBJECTIVE:                                                    Misbah Thompson is a 21 year old male who presents to clinic today for follow-up of oral isotretinoin therapy for severe, recalcitrant acne.    Months completed :4  Current dosage : 40 mg bid.  Treatment response : few nodules on the face   Side effects noted : Dryness handled with cerave    PHQ-9 Score:  No flowsheet data found.  PHQ-2 Score:   PHQ-2 ( 1999 Pfizer) 4/19/2021 3/22/2021   Q1: Little interest or pleasure in doing things 0 0   Q2: Feeling down, depressed or hopeless 0 0   PHQ-2 Score 0 0     No flowsheet data found.    iPLEDGE #: [id number]    Refer to electronic medical record (EMR) for past medical history and medications.      ROS : 14 point review of systems was negative except the symptoms listed above in the HPI.      OBJECTIVE:                                                      Wt Readings from Last 2 Encounters:   03/22/21 144 lb (65.3 kg)   10/13/20 147 lb (66.7 kg)     GENERAL: healthy, alert and no distress  SKIN: Lebron Skin Type - II.  Face were examined. The dermatoscope was used to help evaluate pigmented lesions.  Skin Pertinent Findings:    Face: residual moderate scarring, single inflammatory papules at this time.    Chest/ back : clear    Diagnostic Test Results:  No results found for this or any previous visit (from the past 24 hour(s)).          ASSESSMENT:                                                      Encounter Diagnoses   Name Primary?     Acne vulgaris Yes     Long term use of isotretinoin      Comment : severe acne, oral isotretinoin treatment with monthly monitoring.        PLAN:                                                    Patient Instructions   isotretinoin 40 mg one tab orally two times per day  Lab work next month    RTC in one month for follow up, or sooner prn, with laboratory studies completed.    Oral  isotretinoin is again discussed fully with the patient .    It is a very effective drug to treat acne vulgaris but has many significant side effects. Chief among these are teratogenesis, hepatic injury, dyslipidemia and severe drying of the mucous membranes. All of these issues have been discussed in detail. Monthly blood tests to monitor lipids and liver functions will be necessary. Expect painful dryness and/or fissuring around the lips, eyes, and other moist areas of the body. Balms may be protective. Contact lenses may be too painful to wear temporarily while on this drug. Episodes of significant depression have been reported, including suicidal ideation and attempts in rare cases. It may also cause pseudotumor cerebri and hyperostosis. The patient will report any such changes in mood, depressive symptoms or suicidal thoughts, headaches, joint or bone pains.    Female patients MUST use two simultaneous methods of family planning. Accutane is Category X for pregnancy, meaning it will cause fetal teratogenic malformations, and pregnancy MUST be avoided while on this drug. For that reason, the patient is admonished to never share the medication.    The dose is 0.5-1 mg/kg in two divided doses daily for 15-20 weeks.    After discussion of these important issues, he indicates complete understanding of all of the above, and Does wish to proceed with accutane therapy. Discussed the importance of sticking with the program, not picking or excoriating.        Oral isotretinoin dosing:  Month #1: 30 mg bid =1800  Month#2 40 mg bid = 2400  Month#3 40 mg bid = 2400  Month#4 40 mg bid = 2400    Total dose to date : 8800  mg  Goal dosage : 10.500 mg      PROCEDURES:                                                    None.

## 2021-05-24 ENCOUNTER — OFFICE VISIT (OUTPATIENT)
Dept: FAMILY MEDICINE | Facility: CLINIC | Age: 21
End: 2021-05-24
Payer: COMMERCIAL

## 2021-05-24 VITALS — WEIGHT: 154.2 LBS | DIASTOLIC BLOOD PRESSURE: 78 MMHG | SYSTOLIC BLOOD PRESSURE: 112 MMHG | BODY MASS INDEX: 21.51 KG/M2

## 2021-05-24 DIAGNOSIS — Z79.899 LONG TERM USE OF ISOTRETINOIN: ICD-10-CM

## 2021-05-24 DIAGNOSIS — L70.0 ACNE VULGARIS: Primary | ICD-10-CM

## 2021-05-24 PROCEDURE — 99213 OFFICE O/P EST LOW 20 MIN: CPT | Performed by: FAMILY MEDICINE

## 2021-05-24 RX ORDER — ISOTRETINOIN 40 MG/1
40 CAPSULE ORAL 2 TIMES DAILY
Qty: 60 CAPSULE | Refills: 0 | Status: SHIPPED | OUTPATIENT
Start: 2021-05-24 | End: 2021-06-21

## 2021-06-20 NOTE — PROGRESS NOTES
[facility] - PRIMARY CARE SKIN    CC : Acne and Oral Isotretinoin Follow-up   SUBJECTIVE:                                                    Misbah Thompson is a 21 year old male who presents to clinic today for follow-up of oral isotretinoin therapy for severe, recalcitrant acne.    Months completed :5  Current dosage : 40 mg bid.  Treatment response : few nodules on the face   Side effects noted : Dryness handled with cerave , hair is very try    PHQ-9 Score:  No flowsheet data found.  PHQ-2 Score:   PHQ-2 ( 1999 Pfizer) 5/24/2021 4/19/2021   Q1: Little interest or pleasure in doing things 0 0   Q2: Feeling down, depressed or hopeless 0 0   PHQ-2 Score 0 0     No flowsheet data found.    iPLEDGE #: [id number]    Refer to electronic medical record (EMR) for past medical history and medications.      ROS : 14 point review of systems was negative except the symptoms listed above in the HPI.      OBJECTIVE:                                                      Wt Readings from Last 2 Encounters:   05/24/21 154 lb 3.2 oz (69.9 kg)   03/22/21 144 lb (65.3 kg)     GENERAL: healthy, alert and no distress  SKIN: Lebron Skin Type - II.  Face were examined. The dermatoscope was used to help evaluate pigmented lesions.  Skin Pertinent Findings:    Face: residual moderate scarring, single inflammatory papules at this time.    Chest/ back : clear    Diagnostic Test Results:  No results found for this or any previous visit (from the past 24 hour(s)).          ASSESSMENT:                                                      Encounter Diagnoses   Name Primary?     Acne vulgaris Yes     Long term use of isotretinoin      Comment : severe acne, oral isotretinoin treatment with monthly monitoring.        PLAN:                                                    Patient Instructions   Isotretinoin 40 mg one tab orally two times per day  Recheck in 4 weeks    RTC in one month for follow up, or sooner prn, with laboratory  studies completed.    Oral isotretinoin is again discussed fully with the patient .    It is a very effective drug to treat acne vulgaris but has many significant side effects. Chief among these are teratogenesis, hepatic injury, dyslipidemia and severe drying of the mucous membranes. All of these issues have been discussed in detail. Monthly blood tests to monitor lipids and liver functions will be necessary. Expect painful dryness and/or fissuring around the lips, eyes, and other moist areas of the body. Balms may be protective. Contact lenses may be too painful to wear temporarily while on this drug. Episodes of significant depression have been reported, including suicidal ideation and attempts in rare cases. It may also cause pseudotumor cerebri and hyperostosis. The patient will report any such changes in mood, depressive symptoms or suicidal thoughts, headaches, joint or bone pains.    Female patients MUST use two simultaneous methods of family planning. Accutane is Category X for pregnancy, meaning it will cause fetal teratogenic malformations, and pregnancy MUST be avoided while on this drug. For that reason, the patient is admonished to never share the medication.    The dose is 0.5-1 mg/kg in two divided doses daily for 15-20 weeks.    After discussion of these important issues, he indicates complete understanding of all of the above, and Does wish to proceed with accutane therapy. Discussed the importance of sticking with the program, not picking or excoriating.        Oral isotretinoin dosing:  Month #1: 30 mg bid =1800  Month#2 40 mg bid = 2400  Month#3 40 mg bid = 2400  Month#4 40 mg bid = 2400  Month#5 40 mg bid = 2400      Total dose to date : 08760  mg  Goal dosage : 10.500 mg      PROCEDURES:                                                    None.

## 2021-06-21 ENCOUNTER — OFFICE VISIT (OUTPATIENT)
Dept: FAMILY MEDICINE | Facility: CLINIC | Age: 21
End: 2021-06-21
Payer: COMMERCIAL

## 2021-06-21 VITALS — DIASTOLIC BLOOD PRESSURE: 86 MMHG | BODY MASS INDEX: 20.22 KG/M2 | WEIGHT: 145 LBS | SYSTOLIC BLOOD PRESSURE: 108 MMHG

## 2021-06-21 DIAGNOSIS — L70.0 ACNE VULGARIS: Primary | ICD-10-CM

## 2021-06-21 DIAGNOSIS — Z79.899 LONG TERM USE OF ISOTRETINOIN: ICD-10-CM

## 2021-06-21 LAB
BASOPHILS # BLD AUTO: 0 10E9/L (ref 0–0.2)
BASOPHILS NFR BLD AUTO: 0.4 %
DIFFERENTIAL METHOD BLD: NORMAL
EOSINOPHIL # BLD AUTO: 0.1 10E9/L (ref 0–0.7)
EOSINOPHIL NFR BLD AUTO: 1.9 %
ERYTHROCYTE [DISTWIDTH] IN BLOOD BY AUTOMATED COUNT: 12.7 % (ref 10–15)
HCT VFR BLD AUTO: 43.4 % (ref 40–53)
HGB BLD-MCNC: 15.2 G/DL (ref 13.3–17.7)
LYMPHOCYTES # BLD AUTO: 1.8 10E9/L (ref 0.8–5.3)
LYMPHOCYTES NFR BLD AUTO: 31.2 %
MCH RBC QN AUTO: 29.1 PG (ref 26.5–33)
MCHC RBC AUTO-ENTMCNC: 35 G/DL (ref 31.5–36.5)
MCV RBC AUTO: 83 FL (ref 78–100)
MONOCYTES # BLD AUTO: 0.4 10E9/L (ref 0–1.3)
MONOCYTES NFR BLD AUTO: 7 %
NEUTROPHILS # BLD AUTO: 3.4 10E9/L (ref 1.6–8.3)
NEUTROPHILS NFR BLD AUTO: 59.5 %
PLATELET # BLD AUTO: 158 10E9/L (ref 150–450)
RBC # BLD AUTO: 5.23 10E12/L (ref 4.4–5.9)
WBC # BLD AUTO: 5.7 10E9/L (ref 4–11)

## 2021-06-21 PROCEDURE — 36415 COLL VENOUS BLD VENIPUNCTURE: CPT | Performed by: FAMILY MEDICINE

## 2021-06-21 PROCEDURE — 84460 ALANINE AMINO (ALT) (SGPT): CPT | Performed by: FAMILY MEDICINE

## 2021-06-21 PROCEDURE — 80061 LIPID PANEL: CPT | Performed by: FAMILY MEDICINE

## 2021-06-21 PROCEDURE — 85025 COMPLETE CBC W/AUTO DIFF WBC: CPT | Performed by: FAMILY MEDICINE

## 2021-06-21 PROCEDURE — 99213 OFFICE O/P EST LOW 20 MIN: CPT | Performed by: FAMILY MEDICINE

## 2021-06-21 RX ORDER — ISOTRETINOIN 40 MG/1
40 CAPSULE ORAL 2 TIMES DAILY
Qty: 60 CAPSULE | Refills: 0 | Status: SHIPPED | OUTPATIENT
Start: 2021-06-21

## 2021-06-22 ENCOUNTER — TELEPHONE (OUTPATIENT)
Dept: FAMILY MEDICINE | Facility: CLINIC | Age: 21
End: 2021-06-22

## 2021-06-22 LAB
ALT SERPL W P-5'-P-CCNC: 27 U/L (ref 0–70)
CHOLEST SERPL-MCNC: 166 MG/DL
HDLC SERPL-MCNC: 39 MG/DL
LDLC SERPL CALC-MCNC: 105 MG/DL
NONHDLC SERPL-MCNC: 127 MG/DL
TRIGL SERPL-MCNC: 112 MG/DL

## 2021-06-22 NOTE — LETTER
June 24, 2021    Misbah Thompson  [id number] [address] [facility] [address] [facility] [address] [facility] [address]        Dear Misbah,    This is a letter regarding your completed lab results. The tested values are below and were normal.    Office Visit on 06/21/2021   Component Date Value Ref Range Status     Cholesterol 06/21/2021 166  <200 mg/dL Final     Triglycerides 06/21/2021 112  <150 mg/dL Final     HDL Cholesterol 06/21/2021 39* >39 mg/dL Final     LDL Cholesterol Calculated 06/21/2021 105* <100 mg/dL Final     Non HDL Cholesterol 06/21/2021 127  <130 mg/dL Final     WBC 06/21/2021 5.7  4.0 - 11.0 10e9/L Final     RBC Count 06/21/2021 5.23  4.4 - 5.9 10e12/L Final     Hemoglobin 06/21/2021 15.2  13.3 - 17.7 g/dL Final     Hematocrit 06/21/2021 43.4  40.0 - 53.0 % Final     MCV 06/21/2021 83  78 - 100 fl Final     MCH 06/21/2021 29.1  26.5 - 33.0 pg Final     MCHC 06/21/2021 35.0  31.5 - 36.5 g/dL Final     RDW 06/21/2021 12.7  10.0 - 15.0 % Final     Platelet Count 06/21/2021 158  150 - 450 10e9/L Final     % Neutrophils 06/21/2021 59.5  % Final     % Lymphocytes 06/21/2021 31.2  % Final     % Monocytes 06/21/2021 7.0  % Final     % Eosinophils 06/21/2021 1.9  % Final     % Basophils 06/21/2021 0.4  % Final     Absolute Neutrophil 06/21/2021 3.4  1.6 - 8.3 10e9/L Final     Absolute Lymphocytes 06/21/2021 1.8  0.8 - 5.3 10e9/L Final     Absolute Monocytes 06/21/2021 0.4  0.0 - 1.3 10e9/L Final     Absolute Eosinophils 06/21/2021 0.1  0.0 - 0.7 10e9/L Final     Absolute Basophils 06/21/2021 0.0  0.0 - 0.2 10e9/L Final     Diff Method 06/21/2021 Automated Method   Final     ALT 06/21/2021 27  0 - 70 U/L Final         Thank you for allowing me to be involved in your health care and for choosing [facility].  If you have any questions or concerns please feel free to contact me at [phone].      Sincerely,      Blanca Anand M.D.

## 2021-07-25 NOTE — PROGRESS NOTES
[facility] - PRIMARY CARE SKIN    CC : Acne and Oral Isotretinoin Follow-up   SUBJECTIVE:                                                    Misbah Thompson is a 21 year old male who presents to clinic today for follow-up of oral isotretinoin therapy for severe, recalcitrant acne.    Months completed :6  Current dosage : 40 mg bid.  Treatment response : two single papules on the face, in the mask distribution  Side effects noted : Dryness handled with cerave , hair is very try    PHQ-9 Score:  No flowsheet data found.  PHQ-2 Score:   PHQ-2 ( 1999 Pfizer) 6/21/2021 5/24/2021   Q1: Little interest or pleasure in doing things 0 0   Q2: Feeling down, depressed or hopeless 0 0   PHQ-2 Score 0 0     No flowsheet data found.    iPLEDGE #: [id number]    Refer to electronic medical record (EMR) for past medical history and medications.      ROS : 14 point review of systems was negative except the symptoms listed above in the HPI.      OBJECTIVE:                                                      Wt Readings from Last 2 Encounters:   06/21/21 145 lb (65.8 kg)   05/24/21 154 lb 3.2 oz (69.9 kg)     GENERAL: healthy, alert and no distress  SKIN: Lebron Skin Type - II.  Face were examined. The dermatoscope was used to help evaluate pigmented lesions.  Skin Pertinent Findings:    Face: residual moderate scarring,     Chest/ back : clear    Diagnostic Test Results:  No results found for this or any previous visit (from the past 24 hour(s)).          ASSESSMENT:                                                      Encounter Diagnoses   Name Primary?     Acne vulgaris Yes     Long term use of isotretinoin      Comment : severe acne, oral isotretinoin treatment with monthly monitoring.        PLAN:                                                    Patient Instructions   Finish remaining oral isotretinoin   Sunscreen continue  Cetaphil or Cerave for facial cleanser  Lab work today      Oral isotretinoin dosing:  Month  #1: 30 mg bid =1800  Month#2 40 mg bid = 2400  Month#3 40 mg bid = 2400  Month#4 40 mg bid = 2400  Month#5 40 mg bid = 2400   Month#6 40 mg bid = 2400    Total dose to date : 47058  mg  Goal dosage : 10.500 mg      PROCEDURES:                                                    None.

## 2021-07-26 ENCOUNTER — OFFICE VISIT (OUTPATIENT)
Dept: FAMILY MEDICINE | Facility: CLINIC | Age: 21
End: 2021-07-26
Payer: COMMERCIAL

## 2021-07-26 VITALS — DIASTOLIC BLOOD PRESSURE: 55 MMHG | BODY MASS INDEX: 20.22 KG/M2 | SYSTOLIC BLOOD PRESSURE: 110 MMHG | WEIGHT: 145 LBS

## 2021-07-26 DIAGNOSIS — Z79.899 LONG TERM USE OF ISOTRETINOIN: ICD-10-CM

## 2021-07-26 DIAGNOSIS — L70.0 ACNE VULGARIS: Primary | ICD-10-CM

## 2021-07-26 LAB
BASOPHILS # BLD AUTO: 0 10E3/UL (ref 0–0.2)
BASOPHILS NFR BLD AUTO: 0 %
EOSINOPHIL # BLD AUTO: 0.3 10E3/UL (ref 0–0.7)
EOSINOPHIL NFR BLD AUTO: 4 %
ERYTHROCYTE [DISTWIDTH] IN BLOOD BY AUTOMATED COUNT: 12.6 % (ref 10–15)
HCT VFR BLD AUTO: 42.7 % (ref 40–53)
HGB BLD-MCNC: 15.1 G/DL (ref 13.3–17.7)
LYMPHOCYTES # BLD AUTO: 2 10E3/UL (ref 0.8–5.3)
LYMPHOCYTES NFR BLD AUTO: 31 %
MCH RBC QN AUTO: 28.9 PG (ref 26.5–33)
MCHC RBC AUTO-ENTMCNC: 35.4 G/DL (ref 31.5–36.5)
MCV RBC AUTO: 82 FL (ref 78–100)
MONOCYTES # BLD AUTO: 0.5 10E3/UL (ref 0–1.3)
MONOCYTES NFR BLD AUTO: 7 %
NEUTROPHILS # BLD AUTO: 3.8 10E3/UL (ref 1.6–8.3)
NEUTROPHILS NFR BLD AUTO: 57 %
PLATELET # BLD AUTO: 195 10E3/UL (ref 150–450)
RBC # BLD AUTO: 5.23 10E6/UL (ref 4.4–5.9)
WBC # BLD AUTO: 6.6 10E3/UL (ref 4–11)

## 2021-07-26 PROCEDURE — 85025 COMPLETE CBC W/AUTO DIFF WBC: CPT | Performed by: FAMILY MEDICINE

## 2021-07-26 PROCEDURE — 36415 COLL VENOUS BLD VENIPUNCTURE: CPT | Performed by: FAMILY MEDICINE

## 2021-07-26 PROCEDURE — 80061 LIPID PANEL: CPT | Performed by: FAMILY MEDICINE

## 2021-07-26 PROCEDURE — 99213 OFFICE O/P EST LOW 20 MIN: CPT | Performed by: FAMILY MEDICINE

## 2021-07-26 PROCEDURE — 84460 ALANINE AMINO (ALT) (SGPT): CPT | Performed by: FAMILY MEDICINE

## 2021-07-26 NOTE — PATIENT INSTRUCTIONS
Finish remaining oral isotretinoin   Sunscreen continue  Cetaphil or Cerave for facial cleanser  Lab work today

## 2021-07-27 ENCOUNTER — TELEPHONE (OUTPATIENT)
Dept: FAMILY MEDICINE | Facility: CLINIC | Age: 21
End: 2021-07-27

## 2021-07-27 LAB
ALT SERPL W P-5'-P-CCNC: 30 U/L (ref 0–70)
CHOLEST SERPL-MCNC: 158 MG/DL
FASTING STATUS PATIENT QL REPORTED: YES
HDLC SERPL-MCNC: 39 MG/DL
LDLC SERPL CALC-MCNC: 105 MG/DL
NONHDLC SERPL-MCNC: 119 MG/DL
TRIGL SERPL-MCNC: 70 MG/DL

## 2021-07-27 NOTE — TELEPHONE ENCOUNTER
----- Message from Blanca Anand MD sent at 7/27/2021  1:23 PM CDT -----  Please call,      Lab work looks good.     Thanks  Norma

## 2021-07-27 NOTE — TELEPHONE ENCOUNTER
Not feeling well     98/46   73HR     Pulse Ox 91-92% Sent [employer] message with results.    Yamilet COATESRN BSN  [facility]  [phone]

## 2021-10-02 ENCOUNTER — HEALTH MAINTENANCE LETTER (OUTPATIENT)
Age: 21
End: 2021-10-02

## 2022-01-22 ENCOUNTER — HEALTH MAINTENANCE LETTER (OUTPATIENT)
Age: 22
End: 2022-01-22

## 2022-07-29 NOTE — TELEPHONE ENCOUNTER
----- Message from Blanca Anand MD sent at 6/22/2021 12:33 PM CDT -----  Please call,     Lab work looks good.     Thank you,   Blanca long   [facility]

## 2022-09-04 ENCOUNTER — HEALTH MAINTENANCE LETTER (OUTPATIENT)
Age: 22
End: 2022-09-04

## 2023-04-29 ENCOUNTER — HEALTH MAINTENANCE LETTER (OUTPATIENT)
Age: 23
End: 2023-04-29

## 2023-05-09 ENCOUNTER — OFFICE VISIT (OUTPATIENT)
Dept: FAMILY MEDICINE | Facility: CLINIC | Age: 23
End: 2023-05-09
Payer: COMMERCIAL

## 2023-05-09 VITALS
OXYGEN SATURATION: 99 % | BODY MASS INDEX: 20.61 KG/M2 | HEART RATE: 67 BPM | TEMPERATURE: 98.2 F | SYSTOLIC BLOOD PRESSURE: 120 MMHG | RESPIRATION RATE: 10 BRPM | DIASTOLIC BLOOD PRESSURE: 76 MMHG | HEIGHT: 71 IN | WEIGHT: 147.2 LBS

## 2023-05-09 DIAGNOSIS — L70.0 ACNE VULGARIS: Primary | ICD-10-CM

## 2023-05-09 PROCEDURE — 99213 OFFICE O/P EST LOW 20 MIN: CPT | Performed by: PHYSICIAN ASSISTANT

## 2023-05-09 RX ORDER — CLINDAMYCIN PHOSPHATE 10 UG/ML
LOTION TOPICAL 2 TIMES DAILY
Qty: 60 ML | Refills: 11 | Status: SHIPPED | OUTPATIENT
Start: 2023-05-09

## 2023-05-09 RX ORDER — ADAPALENE 45 G/G
GEL TOPICAL AT BEDTIME
Qty: 45 G | Refills: 11 | Status: SHIPPED | OUTPATIENT
Start: 2023-05-09

## 2023-05-09 ASSESSMENT — PAIN SCALES - GENERAL: PAINLEVEL: NO PAIN (0)

## 2023-05-09 NOTE — PROGRESS NOTES
"  Assessment & Plan     Acne vulgaris  No current acne. Confined to face only. Prefer topical management for mild to moderate acne. Management to be started. If no improvement in 2 months, see derm. Of note, consider rosacea in future given description. Not present today. Discussed triggers and facial sun screen use.   - Adult Dermatology Referral; Future  - clindamycin (CLEOCIN T) 1 % external lotion; Apply topically 2 times daily  - adapalene (DIFFERIN) 0.1 % external gel; Apply topically At Bedtime  Medication use and side effects discussed with the patient. Patient is in complete understanding and agreement with plan.         Fahad Barlow PA-C  [facility] FAZAL Crawley is a 23 year old, presenting for the following health issues:  Derm Problem        5/9/2023     1:00 PM   Additional Questions   Roomed by MADDY Lockett     History of Present Illness       Reason for visit:  Rearising Acne Issues and General Skin Health Concerns    He eats 2-3 servings of fruits and vegetables daily.He consumes 1 sweetened beverage(s) daily.He exercises with enough effort to increase his heart rate 30 to 60 minutes per day.  He exercises with enough effort to increase his heart rate 4 days per week.   He is taking medications regularly.     History of recurrent acne through childhood. Has been on Accutane  various times. Not on for 1.5 years. States last 4 months has noted intermittent flares as well as redness of cheeks throughout the day. Facial wash used. No other current treatments. States only on face.     Review of Systems   Constitutional, HEENT, cardiovascular, pulmonary, GI, , musculoskeletal, neuro, skin, endocrine and psych systems are negative, except as otherwise noted.      Objective    /76 (BP Location: Right arm, Patient Position: Sitting, Cuff Size: Adult Regular)   Pulse 67   Temp 98.2  F (36.8  C) (Oral)   Resp 10   Ht 1.803 m (5' 11\")   Wt " 66.8 kg (147 lb 3.2 oz)   SpO2 99%   BMI 20.53 kg/m    Body mass index is 20.53 kg/m .  Physical Exam   GENERAL: healthy, alert and no distress  SKIN: facial scaring. No active acne noted.   PSYCH: mentation appears normal, affect normal/bright

## 2024-07-06 ENCOUNTER — HEALTH MAINTENANCE LETTER (OUTPATIENT)
Age: 24
End: 2024-07-06

## 2025-07-13 ENCOUNTER — HEALTH MAINTENANCE LETTER (OUTPATIENT)
Age: 25
End: 2025-07-13